# Patient Record
Sex: MALE | Race: WHITE | NOT HISPANIC OR LATINO | Employment: OTHER | URBAN - METROPOLITAN AREA
[De-identification: names, ages, dates, MRNs, and addresses within clinical notes are randomized per-mention and may not be internally consistent; named-entity substitution may affect disease eponyms.]

---

## 2021-03-05 ENCOUNTER — IMMUNIZATION (OUTPATIENT)
Dept: NURSING | Facility: CLINIC | Age: 71
End: 2021-03-05
Payer: COMMERCIAL

## 2021-03-05 PROCEDURE — 91303 PR COVID VAC JANSSEN AD26 0.5ML: CPT

## 2021-03-05 PROCEDURE — 0031A PR COVID VAC JANSSEN AD26 0.5ML: CPT

## 2021-06-15 PROBLEM — Z72.0 TOBACCO USE: Status: ACTIVE | Noted: 2017-05-02

## 2021-06-15 PROBLEM — L03.116 LEFT LEG CELLULITIS: Status: ACTIVE | Noted: 2017-05-02

## 2021-06-15 PROBLEM — N17.9 AKI (ACUTE KIDNEY INJURY) (H): Status: ACTIVE | Noted: 2017-05-02

## 2021-11-24 ENCOUNTER — APPOINTMENT (OUTPATIENT)
Dept: ULTRASOUND IMAGING | Facility: HOSPITAL | Age: 71
End: 2021-11-24
Attending: EMERGENCY MEDICINE
Payer: COMMERCIAL

## 2021-11-24 ENCOUNTER — HOSPITAL ENCOUNTER (EMERGENCY)
Facility: HOSPITAL | Age: 71
Discharge: HOME | End: 2021-11-24
Attending: EMERGENCY MEDICINE | Admitting: EMERGENCY MEDICINE
Payer: COMMERCIAL

## 2021-11-24 VITALS
BODY MASS INDEX: 28.44 KG/M2 | SYSTOLIC BLOOD PRESSURE: 160 MMHG | DIASTOLIC BLOOD PRESSURE: 86 MMHG | TEMPERATURE: 97.4 F | HEART RATE: 74 BPM | OXYGEN SATURATION: 95 % | WEIGHT: 210 LBS | HEIGHT: 72 IN | RESPIRATION RATE: 18 BRPM

## 2021-11-24 DIAGNOSIS — L30.9 ECZEMA, UNSPECIFIED TYPE: ICD-10-CM

## 2021-11-24 DIAGNOSIS — I82.462 ACUTE DEEP VEIN THROMBOSIS (DVT) OF CALF MUSCLE VEIN OF LEFT LOWER EXTREMITY (H): ICD-10-CM

## 2021-11-24 LAB
ANION GAP SERPL CALCULATED.3IONS-SCNC: 11 MMOL/L (ref 5–18)
BUN SERPL-MCNC: 15 MG/DL (ref 8–28)
CALCIUM SERPL-MCNC: 9.5 MG/DL (ref 8.5–10.5)
CHLORIDE BLD-SCNC: 106 MMOL/L (ref 98–107)
CO2 SERPL-SCNC: 23 MMOL/L (ref 22–31)
CREAT SERPL-MCNC: 1.01 MG/DL (ref 0.7–1.3)
D DIMER PPP FEU-MCNC: 2.32 UG/ML FEU (ref 0–0.5)
GFR SERPL CREATININE-BSD FRML MDRD: 73 ML/MIN/1.73M2
GLUCOSE BLD-MCNC: 91 MG/DL (ref 70–125)
INR PPP: 1.01 (ref 0.9–1.15)
POTASSIUM BLD-SCNC: 5 MMOL/L (ref 3.5–5)
SODIUM SERPL-SCNC: 140 MMOL/L (ref 136–145)

## 2021-11-24 PROCEDURE — 93971 EXTREMITY STUDY: CPT | Mod: LT

## 2021-11-24 PROCEDURE — 99284 EMERGENCY DEPT VISIT MOD MDM: CPT | Mod: 25

## 2021-11-24 PROCEDURE — 85027 COMPLETE CBC AUTOMATED: CPT | Performed by: EMERGENCY MEDICINE

## 2021-11-24 PROCEDURE — 80048 BASIC METABOLIC PNL TOTAL CA: CPT | Performed by: EMERGENCY MEDICINE

## 2021-11-24 PROCEDURE — 85610 PROTHROMBIN TIME: CPT | Performed by: EMERGENCY MEDICINE

## 2021-11-24 PROCEDURE — 85379 FIBRIN DEGRADATION QUANT: CPT | Performed by: EMERGENCY MEDICINE

## 2021-11-24 PROCEDURE — 36415 COLL VENOUS BLD VENIPUNCTURE: CPT | Performed by: EMERGENCY MEDICINE

## 2021-11-24 RX ORDER — CEPHALEXIN 500 MG/1
500 CAPSULE ORAL 4 TIMES DAILY
Qty: 28 CAPSULE | Refills: 0 | Status: SHIPPED | OUTPATIENT
Start: 2021-11-24 | End: 2021-11-24

## 2021-11-24 ASSESSMENT — ENCOUNTER SYMPTOMS
CHEST TIGHTNESS: 0
FEVER: 0
COLOR CHANGE: 1
SHORTNESS OF BREATH: 0
COUGH: 0
ABDOMINAL PAIN: 0

## 2021-11-24 ASSESSMENT — MIFFLIN-ST. JEOR: SCORE: 1735.55

## 2021-11-24 NOTE — ED PROVIDER NOTES
Emergency Department Encounter      NAME: Jose Ramirez  AGE: 71 year old male  YOB: 1950  MRN: [id number]  EVALUATION DATE & TIME: 11/24/2021  3:29 PM    PCP: No Ref-Primary, Physician    ED PROVIDER: Agustin Munson M.D.      Chief Complaint   Patient presents with     Edema         FINAL IMPRESSION:  1. Eczema, unspecified type    2. Cellulitis of left lower extremity        MEDICAL DECISION MAKING:    3:40 PM I met with the patient, obtained history, performed an initial exam, and discussed options and plan for diagnostics and treatment here in the ED. PPE: face mask    Pertinent Labs & Imaging studies reviewed. (See chart for details)       This patient is a 71-year-old male with a history of COPD who presents with left leg swelling and pain.  He says over the past week he has noticed some swelling of the left lower leg and recently has become slightly erythematous as well.  He has pain in the medial proximal left calf when he stands and walks.  Says the pain is about a 6 out of 10 with standing but is not present when he is supine.  The patient's history states that he has had pulmonary embolism in the past but he denied ever having a pulmonary embolism or being on blood thinners.  I do note that there is pulmonary emphysema noted on his past medical history.  I am going to send off some lab work and a left lower extremity Doppler ultrasound.  I will likely signed this patient out to the oncoming ER provider to follow-up on these tests.  I suspect that the patient at least has eczema but also possibly cellulitis and/or superficial thrombophlebitis and/or DVT.  I did send a D-dimer with his lab work but this was more to see if he needs a repeat ultrasound next week in case his ultrasound is negative to screen for a occult distal DVT not seen on the ultrasound.      The importance of close follow up was discussed. We reviewed warning signs and symptoms, and I instructed Mr. Ramirez to return to the  emergency department immediately if he develops any new or worsening symptoms. I provided additional verbal discharge instructions. Mr. Ramirez expressed understanding and agreement with this plan of care, his questions were answered, and he was discharged in stable condition.     MEDICATIONS GIVEN IN THE EMERGENCY:  Medications - No data to display    NEW PRESCRIPTIONS STARTED AT TODAY'S ER VISIT:  New Prescriptions    CEPHALEXIN (KEFLEX) 500 MG CAPSULE    Take 1 capsule (500 mg) by mouth 4 times daily for 7 days          =================================================================    HPI    Patient information was obtained from: Patient    Use of : N/A         Jose MATTHEW Ashley is a 71 year old male with a past medical history of COPD, PE, CHER, hyperlipidemia, and tobacco abuse who presents to the ED via walk in for evaluation of lower extremity edema.     Patient reports ongoing left lower leg edema with associated erythema for the past week. He describes the pain as dull and throbbing, rating 6/10 in severity. Pain is provoked with standing and movement and improves with laying down. Patient reports he does limp when he walks due to the pain. He is not on any blood thinners and denies a history of PE. However, per the patient's problem list he does have a history of PE. Patient also reports eczema on his bilateral lower legs. He has had these patches of eczema for the past several years. Patient has seen a dermatologist for this before and has used a topical cream in the past. He is currently not using anything for his eczema and notes he scratches at the patches a lot. Denies fever, cough, shortness of breath, syncope, or any other complaints at this time.     REVIEW OF SYSTEMS   Review of Systems   Constitutional: Negative for fever.   Respiratory: Negative for cough and shortness of breath.    Cardiovascular: Positive for leg swelling (left).   Musculoskeletal: Positive for gait problem.   Skin:  Positive for color change (erythema left leg) and rash (eczema bialteral legs).   Neurological: Negative for syncope.   All other systems reviewed and are negative.       PAST MEDICAL HISTORY:  History reviewed. No pertinent past medical history.    PAST SURGICAL HISTORY:  Past Surgical History:   Procedure Laterality Date     CHOLECYSTECTOMY       LAPAROSCOPIC APPENDECTOMY N/A 3/17/2016    Procedure: APPENDECTOMY LAPAROSCOPIC;  Surgeon: Sebastian Moreno MD;  Location: [facility];  Service:      MOUTH SURGERY         CURRENT MEDICATIONS:    No current facility-administered medications for this encounter.    Current Outpatient Medications:      cephALEXin (KEFLEX) 500 MG capsule, Take 1 capsule (500 mg) by mouth 4 times daily for 7 days, Disp: 28 capsule, Rfl: 0     acetaminophen (TYLENOL) 500 MG tablet, [ACETAMINOPHEN (TYLENOL) 500 MG TABLET] Take 1,000 mg by mouth every 6 (six) hours as needed for pain or fever., Disp: , Rfl:      albuterol (PROVENTIL HFA;VENTOLIN HFA) 90 mcg/actuation inhaler, [ALBUTEROL (PROVENTIL HFA;VENTOLIN HFA) 90 MCG/ACTUATION INHALER] Inhale 2 puffs every 4 (four) hours as needed for wheezing., Disp: , Rfl:      ALPRAZolam (XANAX) 1 MG tablet, [ALPRAZOLAM (XANAX) 1 MG TABLET] Take 1 mg by mouth daily as needed for anxiety., Disp: , Rfl:      cetirizine (ZYRTEC) 10 MG tablet, [CETIRIZINE (ZYRTEC) 10 MG TABLET] Take 10 mg by mouth daily., Disp: , Rfl:      EPINEPHrine (EPIPEN) 0.3 mg/0.3 mL injection, [EPINEPHRINE (EPIPEN) 0.3 MG/0.3 ML INJECTION] Inject 0.3 mL (0.3 mg total) into the shoulder, thigh, or buttocks as needed for anaphylaxis. Inject into thigh., Disp: 1, Rfl: 3     fluocinonide (LIDEX) 0.05 % ointment, [FLUOCINONIDE (LIDEX) 0.05 % OINTMENT] Apply 1 application topically 2 (two) times a day., Disp: , Rfl:      FLUoxetine (PROZAC) 20 MG capsule, [FLUOXETINE (PROZAC) 20 MG CAPSULE] Take 40 mg by mouth daily., Disp: , Rfl:      fluticasone-salmeterol (ADVAIR) 250-50  mcg/dose DISKUS, [FLUTICASONE-SALMETEROL (ADVAIR) 250-50 MCG/DOSE DISKUS] Inhale 1 puff 2 (two) times a day., Disp: , Rfl:      ibuprofen (ADVIL,MOTRIN) 200 MG tablet, [IBUPROFEN (ADVIL,MOTRIN) 200 MG TABLET] Take 400-600 mg by mouth every 8 (eight) hours as needed for pain., Disp: , Rfl:      multivitamin therapeutic (THERAGRAN) tablet, [MULTIVITAMIN THERAPEUTIC (THERAGRAN) TABLET] Take 1 tablet by mouth daily., Disp: , Rfl:      omeprazole (PRILOSEC) 20 MG capsule, [OMEPRAZOLE (PRILOSEC) 20 MG CAPSULE] Take 20 mg by mouth 2 (two) times a day before meals. , Disp: , Rfl:      RANITIDINE HCL (ZANTAC ORAL), [RANITIDINE HCL (ZANTAC ORAL)] Take 2 tablets by mouth 2 (two) times a day as needed., Disp: , Rfl:      simvastatin (ZOCOR) 40 MG tablet, [SIMVASTATIN (ZOCOR) 40 MG TABLET] Take 40 mg by mouth daily., Disp: , Rfl:     ALLERGIES:  Allergies   Allergen Reactions     Polysorbate [Sorbitan]        FAMILY HISTORY:  Family History   Problem Relation Age of Onset     Diabetes Father      Heart Disease Father        SOCIAL HISTORY:   Social History     Socioeconomic History     Marital status:      Spouse name: Not on file     Number of children: Not on file     Years of education: Not on file     Highest education level: Not on file   Occupational History     Not on file   Tobacco Use     Smoking status: Current Every Day Smoker     Packs/day: 0.50     Years: 50.00     Pack years: 25.00     Types: Cigarettes, Cigarettes     Smokeless tobacco: Never Used     Tobacco comment: Stop smoking patient resource.   Substance and Sexual Activity     Alcohol use: Yes     Alcohol/week: 2.0 standard drinks     Comment: Alcoholic Drinks/day: usually 2 drinks/day     Drug use: No     Sexual activity: Not on file   Other Topics Concern     Not on file   Social History Narrative     Not on file     Social Determinants of Health     Financial Resource Strain: Not on file   Food Insecurity: Not on file   Transportation Needs: Not  on file   Physical Activity: Not on file   Stress: Not on file   Social Connections: Not on file   Intimate Partner Violence: Not on file   Housing Stability: Not on file       PHYSICAL EXAM:    Vitals: BP (!) 189/93   Pulse 89   Temp 97.4  F (36.3  C)   Resp 18   Ht 1.829 m (6')   Wt 95.3 kg (210 lb)   SpO2 95%   BMI 28.48 kg/m     Constitutional: Well developed, well nourished. Comfortable appearing.  HENT: Normocephalic, atraumatic, mucous membranes moist, nose normal. Neck- Supple, gross ROM intact.   Eyes: Pupils mid-range, sclera white, no discharge  Respiratory: Clear to auscultation bilaterally, no respiratory distress, no wheezing, speaks full sentences easily.  Cardiovascular: Normal heart rate, regular rhythm, no murmurs. No lower extremity edema, 2+ DP pulses.   GI: Soft, no tenderness to deep palpation in all quadrants, no masses.  Musculoskeletal: Moving all 4 extremities intentionally and without pain. Left leg swollen with slight erythema. Tenderness over medial left calf, thin cord palpated over left proximal shin.   Skin: Warm, dry. Patches of eczema over bilateral shins.   Neurologic: Alert & oriented x 3, speech clear, moving all extremities spontaneously   Psychiatric: Affect normal, cooperative.     LAB:  All pertinent labs reviewed and interpreted.  Labs Ordered and Resulted from Time of ED Arrival to Time of ED Departure - No data to display    RADIOLOGY:  US Lower Extremity Venous Duplex Left    (Results Pending)           IMitzi, am serving as a scribe to document services personally performed by Dr. Agustin Munson based on my observation and the provider's statements to me. IAgustin M.D. attest that Mitzi Maldonado is acting in a scribe capacity, has observed my performance of the services and has documented them in accordance with my direction.      Agustin Munson M.D.  Emergency Medicine  [facility]  EMERGENCY DEPARTMENT  [address] [place] [id number]  [phone]  Dept: [phone]     Agustin Munson MD  11/24/21 3647

## 2021-11-24 NOTE — ED PROVIDER NOTES
ED Provider In Triage Note  [facility]  Encounter Date: Nov 24, 2021    Chief Complaint   Patient presents with     Edema       Brief HPI:   Jose Ramirez is a 71 year old male presenting to the Emergency Department with a chief complaint of bilateral lower extremities that he initially noticed ~24 hours ago. He reports it started with a slight pain in his lower ankle which has migrated up to his calf now. He has a history of chronic obstructive pulmonary disease with chronic shortness of breath; unchanged. No recent travel. No history of DVT. No blood thinner use. No chest pain.    Brief Physical Exam:  BP (!) 189/93   Pulse 89   Temp 97.4  F (36.3  C)   Resp 18   Ht 1.829 m (6')   Wt 95.3 kg (210 lb)   SpO2 95%   BMI 28.48 kg/m    General: Non-toxic appearing  HEENT: Atraumatic  Resp: Diminished breath sounds bilaterally  Abdomen: Non-peritoneal  Skin: scale and excoriation with minimal erythema over lateral left calf. Firm edema proximal left calf, trace edema right calf  Neuro: Alert, oriented, answers questions appropriately  Psych: Behavior appropriate    Plan Initiated in Triage:  No orders of the defined types were placed in this encounter.      PIT Dispo:   Return to lobby while awaiting workup and ED bed availability    Med Berkowitz MD on 11/24/2021 at 3:18 PM    Patient was evaluated by the Physician in Triage due to a limitation of available rooms in the Emergency Department. A plan of care was discussed based on the information obtained on the initial evaluation and patient was consuled to return back to the Emergency Department lobby after this initial evalutaiton until results were obtained or a room became available in the Emergency Department. Patient was counseled not to leave prior to receiving the results of their workup.        Med Berkowitz MD  11/24/21 4359

## 2021-11-24 NOTE — ED PROVIDER NOTES
EMERGENCY DEPARTMENT signout     ED Course & Medical Decision Making         This is a patient that was signed out to me by Dr. Munson.  At the time of signout the DVT ultrasound study was pending.  Plan is to treat for cellulitis if DVT was negative.  DVT scan actually was positive for nonocclusive DVT.    I discussed this with the patient.  I question and plan at length about any PE symptoms.  He is negative for it.  He feels quite well.  He is low risk and he has no contraindications to blood thinning medicine.  I discussed starting blood thinning medicine and he would like to try one of the newer novel agents that does not require monitoring.  I have prescribed a short course of some rivaroxaban to get him through until he can see his primary care provider.    He is given blood thinning medication information, prescription provided, he will follow-up with his regular physician as soon as possible.          Prior to making a final disposition on this patient the results of patient's tests and other diagnostic studies were discussed with the patient. All questions were answered. Patient expressed understanding of the plan and was amenable to it.    Medications - No data to display    Final Impression     1. Eczema, unspecified type    2. Acute deep vein thrombosis (DVT) of calf muscle vein of left lower extremity (H)            Chief Complaint     Chief Complaint   Patient presents with     Edema       Patient ambulatory to triage. Here for swelling and pain at left calf. No trauma. No SOB      HPI     Jose Ramirez is a 71 year old male with a history of hyperlipidemia, PE, COPD, GERD, tobacco abuse, who presents for evaluation of edema.              I, Amanda Morris am serving as a scribe to document services personally performed by Francesco Bazan M.D. based on my observation and the provider's statements to me. I, Francesco Bazan M.D attest that Amanda Morris is acting in a scribe capacity, has observed my  performance of the services and has documented them in accordance with my direction.    Past Medical History     History reviewed. No pertinent past medical history.  Past Surgical History:   Procedure Laterality Date     CHOLECYSTECTOMY       LAPAROSCOPIC APPENDECTOMY N/A 3/17/2016    Procedure: APPENDECTOMY LAPAROSCOPIC;  Surgeon: Sebastian Moreno MD;  Location: [facility];  Service:      MOUTH SURGERY       Family History   Problem Relation Age of Onset     Diabetes Father      Heart Disease Father       Social History     Tobacco Use     Smoking status: Current Every Day Smoker     Packs/day: 0.50     Years: 50.00     Pack years: 25.00     Types: Cigarettes, Cigarettes     Smokeless tobacco: Never Used     Tobacco comment: Stop smoking patient resource.   Substance Use Topics     Alcohol use: Yes     Alcohol/week: 2.0 standard drinks     Comment: Alcoholic Drinks/day: usually 2 drinks/day     Drug use: No       Relevant past medical, surgical, family and social history as documented above, has been reviewed and discussed with patient. No changes or additions, unless otherwise noted in the HPI.    Current Medications     rivaroxaban ANTICOAGULANT (XARELTO) 15 MG TABS tablet  acetaminophen (TYLENOL) 500 MG tablet  albuterol (PROVENTIL HFA;VENTOLIN HFA) 90 mcg/actuation inhaler  ALPRAZolam (XANAX) 1 MG tablet  cetirizine (ZYRTEC) 10 MG tablet  EPINEPHrine (EPIPEN) 0.3 mg/0.3 mL injection  fluocinonide (LIDEX) 0.05 % ointment  FLUoxetine (PROZAC) 20 MG capsule  fluticasone-salmeterol (ADVAIR) 250-50 mcg/dose DISKUS  multivitamin therapeutic (THERAGRAN) tablet  omeprazole (PRILOSEC) 20 MG capsule  RANITIDINE HCL (ZANTAC ORAL)  simvastatin (ZOCOR) 40 MG tablet        Allergies     Allergies   Allergen Reactions     Polysorbate [Sorbitan]        Review of Systems     Review of Systems   Respiratory: Negative for chest tightness and shortness of breath.    Cardiovascular: Positive for leg swelling. Negative for  chest pain.   Gastrointestinal: Negative for abdominal pain.        Remainder of systems reviewed, unless noted in HPI all others negative.    Physical Exam     BP (!) 147/80   Pulse 74   Temp 97.4  F (36.3  C)   Resp 18   Ht 1.829 m (6')   Wt 95.3 kg (210 lb)   SpO2 95%   BMI 28.48 kg/m      Physical Exam  Cardiovascular:      Rate and Rhythm: Normal rate.      Pulses: Normal pulses.   Musculoskeletal:      Comments: Erythema and tenderness to the posterior medial left lower leg   Neurological:      Mental Status: He is alert.             Labs & Imaging         Labs Ordered and Resulted from Time of ED Arrival to Time of ED Departure   D DIMER QUANTITATIVE - Abnormal       Result Value    D-Dimer Quantitative 2.32 (*)    BASIC METABOLIC PANEL - Normal    Sodium 140      Potassium 5.0      Chloride 106      Carbon Dioxide (CO2) 23      Anion Gap 11      Urea Nitrogen 15      Creatinine 1.01      Calcium 9.5      Glucose 91      GFR Estimate 73     INR - Normal    INR 1.01     CBC WITH PLATELETS         Results for orders placed or performed during the hospital encounter of 11/24/21   US Lower Extremity Venous Duplex Left    Impression    IMPRESSION:  1.  Acute DVT within the tibioperoneal trunk, nonocclusive.    2.  Superficial thrombophlebitis within the calf extending from the foot.    Results called to Med Berkowitz at 5:12 PM.   Basic metabolic panel   Result Value Ref Range    Sodium 140 136 - 145 mmol/L    Potassium 5.0 3.5 - 5.0 mmol/L    Chloride 106 98 - 107 mmol/L    Carbon Dioxide (CO2) 23 22 - 31 mmol/L    Anion Gap 11 5 - 18 mmol/L    Urea Nitrogen 15 8 - 28 mg/dL    Creatinine 1.01 0.70 - 1.30 mg/dL    Calcium 9.5 8.5 - 10.5 mg/dL    Glucose 91 70 - 125 mg/dL    GFR Estimate 73 >60 mL/min/1.73m2   Result Value Ref Range    INR 1.01 0.90 - 1.15   D dimer quantitative   Result Value Ref Range    D-Dimer Quantitative 2.32 (H) 0.00 - 0.50 ug/mL CANDI Bazan MD  Emergency Medicine  [facility]  [facility] EMERGENCY DEPARTMENT  [facility] [facility] [id number]  [phone]  11/24/2021       Francesco Bazan MD  11/24/21 8656

## 2021-11-25 LAB
ERYTHROCYTE [DISTWIDTH] IN BLOOD BY AUTOMATED COUNT: 15.2 % (ref 10–15)
HCT VFR BLD AUTO: 44.3 % (ref 40–53)
HGB BLD-MCNC: 15.2 G/DL (ref 13.3–17.7)
MCH RBC QN AUTO: 32.3 PG (ref 26.5–33)
MCHC RBC AUTO-ENTMCNC: 34.3 G/DL (ref 31.5–36.5)
MCV RBC AUTO: 94 FL (ref 78–100)
PLATELET # BLD AUTO: 296 10E3/UL (ref 150–450)
RBC # BLD AUTO: 4.7 10E6/UL (ref 4.4–5.9)
WBC # BLD AUTO: 9 10E3/UL (ref 4–11)

## 2022-01-30 ENCOUNTER — HEALTH MAINTENANCE LETTER (OUTPATIENT)
Age: 72
End: 2022-01-30

## 2022-07-05 ENCOUNTER — APPOINTMENT (OUTPATIENT)
Dept: ULTRASOUND IMAGING | Facility: HOSPITAL | Age: 72
End: 2022-07-05
Attending: STUDENT IN AN ORGANIZED HEALTH CARE EDUCATION/TRAINING PROGRAM
Payer: COMMERCIAL

## 2022-07-05 ENCOUNTER — HOSPITAL ENCOUNTER (EMERGENCY)
Facility: HOSPITAL | Age: 72
Discharge: HOME | End: 2022-07-05
Admitting: PHYSICIAN ASSISTANT
Payer: COMMERCIAL

## 2022-07-05 VITALS
HEIGHT: 72 IN | TEMPERATURE: 98.5 F | DIASTOLIC BLOOD PRESSURE: 74 MMHG | WEIGHT: 220 LBS | OXYGEN SATURATION: 98 % | HEART RATE: 65 BPM | BODY MASS INDEX: 29.8 KG/M2 | RESPIRATION RATE: 18 BRPM | SYSTOLIC BLOOD PRESSURE: 158 MMHG

## 2022-07-05 DIAGNOSIS — M79.661 PAIN OF RIGHT LOWER LEG: ICD-10-CM

## 2022-07-05 PROCEDURE — 93971 EXTREMITY STUDY: CPT | Mod: RT

## 2022-07-05 PROCEDURE — 99284 EMERGENCY DEPT VISIT MOD MDM: CPT | Mod: 25

## 2022-07-05 ASSESSMENT — ENCOUNTER SYMPTOMS
COLOR CHANGE: 0
VOICE CHANGE: 0
MYALGIAS: 1
PALPITATIONS: 0
WHEEZING: 0
SHORTNESS OF BREATH: 0
ABDOMINAL PAIN: 0
HEADACHES: 0
HEMATURIA: 0
ADENOPATHY: 0
WOUND: 0
ARTHRALGIAS: 0
CONFUSION: 0
CHEST TIGHTNESS: 0
AGITATION: 0
DIAPHORESIS: 0
DYSURIA: 0
COUGH: 0
WEAKNESS: 0
FATIGUE: 0

## 2022-07-05 NOTE — ED NOTES
ED Provider In Triage Note  [facility]  Encounter Date: Jul 5, 2022    Chief Complaint   Patient presents with     Leg Pain       Brief HPI:   Jose Ramirez is a 72 year old male presenting to the Emergency Department with a chief complaint of    Hx of Left leg DVT (Nov 2021)  Now it feels simialr in Right leg, started last night  No longer on AC  No new shortness of breath (has COPD, but not different)    Brief Physical Exam:  There were no vitals taken for this visit.  General: Non-toxic appearing  HEENT: Atraumatic  Resp: No respiratory distress  Abdomen: Non-peritoneal  Neuro: Alert, oriented, answers questions appropriately  Psych: Behavior appropriate      Plan Initiated in Triage:  Orders Placed This Encounter     US Lower Extremity Venous Duplex Right       PIT Dispo:   Return to [facility] while awaiting workup and ED bed availability    Amilcar Brasher MD on 7/5/2022 at 3:57 PM    Patient was evaluated by the Physician in Triage due to a limitation of available rooms in the Emergency Department. A plan of care was discussed based on the information obtained on the initial evaluation and patient was consuled to return back to the Emergency Department lob after this initial evalutaiton until results were obtained or a room became available in the Emergency Department. Patient was counseled not to leave prior to receiving the results of their workup.     Amilcar Brasher MD  [facility] EMERGENCY DEPARTMENT  [address] [place] [id number]  [phone]     Amilcar Brasher MD  07/05/22 9213

## 2022-07-05 NOTE — ED TRIAGE NOTES
Pt has hx of blood clots in the L knee (last november). Last night similar pain started in RLL. Pt is no longer on thinners.

## 2022-07-06 NOTE — ED PROVIDER NOTES
EMERGENCY DEPARTMENT ENCOUNTER      NAME: Jose Ramirez  AGE: 72 year old male  YOB: 1950  MRN: [id number]  EVALUATION DATE & TIME: No admission date for patient encounter.    PCP: Blair Clarke    ED PROVIDER: Lucia Owens PA-C      Chief Complaint   Patient presents with     Leg Pain         FINAL IMPRESSION:  1. Pain of right lower leg          MEDICAL DECISION MAKING:    Pertinent Labs & Imaging studies reviewed. (See chart for details)  72 year old male with a h/o PE, COPD, cellulitis, LLE DVT no longer on anticoagulation presents to the Emergency Department for evaluation of right calf pain which began suddenly last night and felt similar to prior presentation of DVTs.  On exam he is alert, nontoxic-appearing and in no acute distress.  Vital signs are WNL.  Calf is supple.  No edema, palpable cord or erythema.    Differential diagnosis includes DVT, superficial thrombophlebitis, Baker's cyst, cellulitis, muscle strain.  Ultrasound of the right lower extremity does not reveal any evidence of DVT, thrombophlebitis or other abnormality.  Pain has improved throughout the day after using ibuprofen.  Favor muscle strain.  Discussed strict return precautions should he develop any increased pain, erythema, numbness or tingling.    There is no evidence of acute or emergent process requiring intervention at this time. Pt is appropriate for outpatient management. Provisional nature of today's diagnosis was discussed and strict return precautions were given. Pt expressed understanding and He was discharged to home in good condition.     CRITICAL CARE: None    ED COURSE  9:20 PM  Met and evaluated patient. Discussed ED plan.   9:25 PM discharged to home in good condition by RN.     MEDICATIONS GIVEN IN THE EMERGENCY:  Medications - No data to display    NEW PRESCRIPTIONS STARTED AT TODAY'S ER VISIT  Discharge Medication List as of 7/5/2022  9:22 PM              =================================================================    HPI    Patient information was obtained from: Patient    Use of Intrepreter: N/A      Jose Ramirez is a 72 year old male who presents for evaluation of right calf pain which began suddenly last night.  He took ibuprofen today and it is improving slightly.  No numbness or tingling of the lower extremity.  He notes that this discomfort felt similar to his previous left lower extremity pain which was found to be from DVTs.  He was placed on a blood thinner which has now been discontinued.  No other symptoms of illness such as chest pain, shortness of breath, fever.  Does not recall any injury to this area.      REVIEW OF SYSTEMS   Review of Systems   Constitutional: Negative for diaphoresis and fatigue.   HENT: Negative for voice change.    Respiratory: Negative for cough, chest tightness, shortness of breath and wheezing.    Cardiovascular: Negative for chest pain and palpitations.   Gastrointestinal: Negative for abdominal pain.   Genitourinary: Negative for dysuria and hematuria.   Musculoskeletal: Positive for myalgias. Negative for arthralgias.   Skin: Negative for color change, pallor, rash and wound.   Neurological: Negative for weakness and headaches.   Hematological: Negative for adenopathy.   Psychiatric/Behavioral: Negative for agitation and confusion.   All other systems reviewed and are negative.        PAST MEDICAL HISTORY:  No past medical history on file.    PAST SURGICAL HISTORY:  Past Surgical History:   Procedure Laterality Date     CHOLECYSTECTOMY       LAPAROSCOPIC APPENDECTOMY N/A 3/17/2016    Procedure: APPENDECTOMY LAPAROSCOPIC;  Surgeon: Sebastian Moreno MD;  Location: [facility] OR;  Service:      MOUTH SURGERY             CURRENT MEDICATIONS:    acetaminophen (TYLENOL) 500 MG tablet  albuterol (PROVENTIL HFA;VENTOLIN HFA) 90 mcg/actuation inhaler  ALPRAZolam (XANAX) 1 MG tablet  cetirizine (ZYRTEC) 10 MG  tablet  EPINEPHrine (EPIPEN) 0.3 mg/0.3 mL injection  fluocinonide (LIDEX) 0.05 % ointment  FLUoxetine (PROZAC) 20 MG capsule  fluticasone-salmeterol (ADVAIR) 250-50 mcg/dose DISKUS  multivitamin therapeutic (THERAGRAN) tablet  omeprazole (PRILOSEC) 20 MG capsule  RANITIDINE HCL (ZANTAC ORAL)  rivaroxaban ANTICOAGULANT (XARELTO) 15 MG TABS tablet  simvastatin (ZOCOR) 40 MG tablet        ALLERGIES:  Allergies   Allergen Reactions     Polysorbate [Sorbitan]        FAMILY HISTORY:  Family History   Problem Relation Age of Onset     Diabetes Father      Heart Disease Father        SOCIAL HISTORY:   Social History     Socioeconomic History     Marital status:      Spouse name: Not on file     Number of children: Not on file     Years of education: Not on file     Highest education level: Not on file   Occupational History     Not on file   Tobacco Use     Smoking status: Current Every Day Smoker     Packs/day: 0.50     Years: 50.00     Pack years: 25.00     Types: Cigarettes, Cigarettes     Smokeless tobacco: Never Used     Tobacco comment: Stop smoking patient resource.   Substance and Sexual Activity     Alcohol use: Yes     Alcohol/week: 2.0 standard drinks     Comment: Alcoholic Drinks/day: usually 2 drinks/day     Drug use: No     Sexual activity: Not on file   Other Topics Concern     Not on file   Social History Narrative     Not on file     Social Determinants of Health     Financial Resource Strain: Not on file   Food Insecurity: Not on file   Transportation Needs: Not on file   Physical Activity: Not on file   Stress: Not on file   Social Connections: Not on file   Intimate Partner Violence: Not on file   Housing Stability: Not on file         VITALS:  Patient Vitals for the past 24 hrs:   BP Temp Temp src Pulse Resp SpO2 Height Weight   07/05/22 2120 (!) 158/74 -- -- 65 18 98 % -- --   07/05/22 1613 132/71 98.5  F (36.9  C) Oral 76 20 95 % -- --   07/05/22 1558 (!) 155/84 98.9  F (37.2  C) Temporal 81  16 96 % -- --   07/05/22 1557 -- -- -- -- -- -- 1.829 m (6') 99.8 kg (220 lb)       PHYSICAL EXAM    Physical Exam  Vitals reviewed.   Constitutional:       General: He is not in acute distress.     Appearance: He is well-developed. He is not ill-appearing, toxic-appearing or diaphoretic.   HENT:      Head: Normocephalic and atraumatic.      Nose: Nose normal.      Mouth/Throat:      Mouth: Mucous membranes are moist.   Cardiovascular:      Rate and Rhythm: Normal rate and regular rhythm.      Pulses: Normal pulses.      Heart sounds: No murmur heard.  Pulmonary:      Effort: Pulmonary effort is normal. No tachypnea or respiratory distress.      Breath sounds: No stridor. No decreased breath sounds, wheezing or rales.   Abdominal:      Palpations: Abdomen is soft.   Musculoskeletal:         General: Tenderness (along right medial calf) present. No swelling. Normal range of motion.      Cervical back: Normal range of motion and neck supple.      Right lower leg: No edema.      Left lower leg: No edema.      Comments: Calf is supple.  No palpable cord.  No overlying erythema or ecchymosis.  Distal CMS intact.   Skin:     General: Skin is warm and dry.      Capillary Refill: Capillary refill takes less than 2 seconds.      Coloration: Skin is not pale.      Findings: No rash.   Neurological:      General: No focal deficit present.      Mental Status: He is alert and oriented to person, place, and time.      Cranial Nerves: No cranial nerve deficit.   Psychiatric:         Mood and Affect: Mood normal.         Behavior: Behavior normal.        LAB:  All pertinent labs reviewed and interpreted.    Labs Ordered and Resulted from Time of ED Arrival to Time of ED Departure - No data to display      RADIOLOGY:  Reviewed all pertinent imaging. Please see official radiology report    US Lower Extremity Venous Duplex Right   Final Result   IMPRESSION:   1.  No deep venous thrombosis in the right lower extremity.          Lucia  BRIANNA Owens  Emergency Medicine  [facility] EMERGENCY DEPARTMENT  [facility] [facility] [id number] [phone]  Dept: [phone]    This note has in part been created with speech recognition technology and may create an occasional, unintended word/grammar substitution. Errors are generally corrected in real time. Please message me via [employer] In Basket if you note any errors requiring clarification.       Lucia Owens PA-C  07/05/22 4743

## 2022-07-06 NOTE — DISCHARGE INSTRUCTIONS
You were seen in the emergency department for concern of right calf pain which felt similar to your previous left calf pain and subsequent DVT.  Thankfully, your ultrasound does not show any evidence of DVT or other blood clot.  It is possible that this is a muscle cramp or strain.  I am glad that it got better after ibuprofen.  He may continue with ibuprofen, Tylenol, massage and elevation.  If your pain gets worse, you develop redness, swelling or numbness and tingling of the foot please return to the emergency department.  Otherwise, please follow-up with your primary care provider.

## 2022-09-25 ENCOUNTER — HEALTH MAINTENANCE LETTER (OUTPATIENT)
Age: 72
End: 2022-09-25

## 2023-05-08 ENCOUNTER — HEALTH MAINTENANCE LETTER (OUTPATIENT)
Age: 73
End: 2023-05-08

## 2024-05-11 ENCOUNTER — HEALTH MAINTENANCE LETTER (OUTPATIENT)
Age: 74
End: 2024-05-11

## 2025-05-17 ENCOUNTER — HEALTH MAINTENANCE LETTER (OUTPATIENT)
Age: 75
End: 2025-05-17

## 2025-05-24 ENCOUNTER — APPOINTMENT (OUTPATIENT)
Dept: ULTRASOUND IMAGING | Facility: CLINIC | Age: 75
DRG: 176 | End: 2025-05-24
Attending: PHYSICIAN ASSISTANT
Payer: COMMERCIAL

## 2025-05-24 ENCOUNTER — HOSPITAL ENCOUNTER (INPATIENT)
Facility: CLINIC | Age: 75
LOS: 1 days | Discharge: HOME | DRG: 176 | End: 2025-05-25
Attending: INTERNAL MEDICINE | Admitting: INTERNAL MEDICINE
Payer: COMMERCIAL

## 2025-05-24 ENCOUNTER — APPOINTMENT (OUTPATIENT)
Dept: CARDIOLOGY | Facility: CLINIC | Age: 75
DRG: 176 | End: 2025-05-24
Attending: PHYSICIAN ASSISTANT
Payer: COMMERCIAL

## 2025-05-24 DIAGNOSIS — R91.8 PULMONARY NODULES: ICD-10-CM

## 2025-05-24 DIAGNOSIS — I26.99 ACUTE PULMONARY EMBOLISM WITHOUT ACUTE COR PULMONALE, UNSPECIFIED PULMONARY EMBOLISM TYPE (H): Primary | ICD-10-CM

## 2025-05-24 LAB
ERYTHROCYTE [DISTWIDTH] IN BLOOD BY AUTOMATED COUNT: NORMAL %
HCT VFR BLD AUTO: NORMAL %
HGB BLD-MCNC: 16.4 G/DL (ref 13.3–17.7)
LACTATE SERPL-SCNC: 1.4 MMOL/L (ref 0.7–2)
LVEF ECHO: NORMAL
MCH RBC QN AUTO: NORMAL PG
MCHC RBC AUTO-ENTMCNC: NORMAL G/DL
MCV RBC AUTO: NORMAL FL
PLAT MORPH BLD: ABNORMAL
PLATELET # BLD AUTO: 296 10E3/UL (ref 150–450)
RBC # BLD AUTO: NORMAL 10*6/UL
RBC AGGLUT BLD QL: PRESENT
RBC MORPH BLD: ABNORMAL
UFH PPP CHRO-ACNC: 0.64 IU/ML (ref ?–1.1)
UFH PPP CHRO-ACNC: 0.74 IU/ML (ref ?–1.1)
WBC # BLD AUTO: 6.1 10E3/UL (ref 4–11)

## 2025-05-24 PROCEDURE — 93970 EXTREMITY STUDY: CPT

## 2025-05-24 PROCEDURE — 93306 TTE W/DOPPLER COMPLETE: CPT

## 2025-05-24 PROCEDURE — 83605 ASSAY OF LACTIC ACID: CPT | Performed by: PHYSICIAN ASSISTANT

## 2025-05-24 PROCEDURE — 120N000013 HC R&B IMCU

## 2025-05-24 PROCEDURE — 99223 1ST HOSP IP/OBS HIGH 75: CPT | Performed by: PHYSICIAN ASSISTANT

## 2025-05-24 PROCEDURE — 250N000011 HC RX IP 250 OP 636: Performed by: PHYSICIAN ASSISTANT

## 2025-05-24 PROCEDURE — 85018 HEMOGLOBIN: CPT | Performed by: PHYSICIAN ASSISTANT

## 2025-05-24 PROCEDURE — 36415 COLL VENOUS BLD VENIPUNCTURE: CPT | Performed by: PHYSICIAN ASSISTANT

## 2025-05-24 PROCEDURE — 85520 HEPARIN ASSAY: CPT | Performed by: PHYSICIAN ASSISTANT

## 2025-05-24 PROCEDURE — 250N000013 HC RX MED GY IP 250 OP 250 PS 637: Performed by: PHYSICIAN ASSISTANT

## 2025-05-24 PROCEDURE — 85520 HEPARIN ASSAY: CPT | Performed by: INTERNAL MEDICINE

## 2025-05-24 PROCEDURE — 36415 COLL VENOUS BLD VENIPUNCTURE: CPT | Performed by: INTERNAL MEDICINE

## 2025-05-24 RX ORDER — HYDROMORPHONE HCL IN WATER/PF 6 MG/30 ML
0.4 PATIENT CONTROLLED ANALGESIA SYRINGE INTRAVENOUS
Refills: 0 | Status: DISCONTINUED | OUTPATIENT
Start: 2025-05-24 | End: 2025-05-25

## 2025-05-24 RX ORDER — ONDANSETRON 2 MG/ML
4 INJECTION INTRAMUSCULAR; INTRAVENOUS EVERY 6 HOURS PRN
Status: DISCONTINUED | OUTPATIENT
Start: 2025-05-24 | End: 2025-05-25 | Stop reason: HOSPADM

## 2025-05-24 RX ORDER — AMOXICILLIN 250 MG
1 CAPSULE ORAL 2 TIMES DAILY PRN
Status: DISCONTINUED | OUTPATIENT
Start: 2025-05-24 | End: 2025-05-25 | Stop reason: HOSPADM

## 2025-05-24 RX ORDER — NALOXONE HYDROCHLORIDE 0.4 MG/ML
0.4 INJECTION, SOLUTION INTRAMUSCULAR; INTRAVENOUS; SUBCUTANEOUS
Status: DISCONTINUED | OUTPATIENT
Start: 2025-05-24 | End: 2025-05-25 | Stop reason: HOSPADM

## 2025-05-24 RX ORDER — TAMSULOSIN HYDROCHLORIDE 0.4 MG/1
0.4 CAPSULE ORAL DAILY
COMMUNITY

## 2025-05-24 RX ORDER — ACETAMINOPHEN 650 MG/1
650 SUPPOSITORY RECTAL EVERY 4 HOURS PRN
Status: DISCONTINUED | OUTPATIENT
Start: 2025-05-24 | End: 2025-05-25 | Stop reason: HOSPADM

## 2025-05-24 RX ORDER — OXYCODONE HYDROCHLORIDE 5 MG/1
5 TABLET ORAL EVERY 4 HOURS PRN
Refills: 0 | Status: DISCONTINUED | OUTPATIENT
Start: 2025-05-24 | End: 2025-05-25 | Stop reason: HOSPADM

## 2025-05-24 RX ORDER — NALOXONE HYDROCHLORIDE 0.4 MG/ML
0.2 INJECTION, SOLUTION INTRAMUSCULAR; INTRAVENOUS; SUBCUTANEOUS
Status: DISCONTINUED | OUTPATIENT
Start: 2025-05-24 | End: 2025-05-25 | Stop reason: HOSPADM

## 2025-05-24 RX ORDER — HEPARIN SODIUM 10000 [USP'U]/100ML
0-5000 INJECTION, SOLUTION INTRAVENOUS CONTINUOUS
Status: DISPENSED | OUTPATIENT
Start: 2025-05-24 | End: 2025-05-25

## 2025-05-24 RX ORDER — NICOTINE 21 MG/24HR
1 PATCH, TRANSDERMAL 24 HOURS TRANSDERMAL DAILY
Status: DISCONTINUED | OUTPATIENT
Start: 2025-05-24 | End: 2025-05-25 | Stop reason: HOSPADM

## 2025-05-24 RX ORDER — IPRATROPIUM BROMIDE AND ALBUTEROL SULFATE 2.5; .5 MG/3ML; MG/3ML
3 SOLUTION RESPIRATORY (INHALATION) EVERY 4 HOURS PRN
Status: DISCONTINUED | OUTPATIENT
Start: 2025-05-24 | End: 2025-05-25 | Stop reason: HOSPADM

## 2025-05-24 RX ORDER — ATORVASTATIN CALCIUM 40 MG/1
40 TABLET, FILM COATED ORAL DAILY
Status: DISCONTINUED | OUTPATIENT
Start: 2025-05-24 | End: 2025-05-25 | Stop reason: HOSPADM

## 2025-05-24 RX ORDER — TAMSULOSIN HYDROCHLORIDE 0.4 MG/1
0.4 CAPSULE ORAL DAILY
Status: DISCONTINUED | OUTPATIENT
Start: 2025-05-24 | End: 2025-05-25 | Stop reason: HOSPADM

## 2025-05-24 RX ORDER — ONDANSETRON 4 MG/1
4 TABLET, ORALLY DISINTEGRATING ORAL EVERY 6 HOURS PRN
Status: DISCONTINUED | OUTPATIENT
Start: 2025-05-24 | End: 2025-05-25 | Stop reason: HOSPADM

## 2025-05-24 RX ORDER — AMOXICILLIN 250 MG
2 CAPSULE ORAL 2 TIMES DAILY PRN
Status: DISCONTINUED | OUTPATIENT
Start: 2025-05-24 | End: 2025-05-25 | Stop reason: HOSPADM

## 2025-05-24 RX ORDER — ALBUTEROL SULFATE 90 UG/1
2 INHALANT RESPIRATORY (INHALATION) EVERY 4 HOURS PRN
Status: DISCONTINUED | OUTPATIENT
Start: 2025-05-24 | End: 2025-05-25 | Stop reason: HOSPADM

## 2025-05-24 RX ORDER — ATORVASTATIN CALCIUM 40 MG/1
40 TABLET, FILM COATED ORAL DAILY
COMMUNITY

## 2025-05-24 RX ORDER — HYDROMORPHONE HCL IN WATER/PF 6 MG/30 ML
0.2 PATIENT CONTROLLED ANALGESIA SYRINGE INTRAVENOUS
Refills: 0 | Status: DISCONTINUED | OUTPATIENT
Start: 2025-05-24 | End: 2025-05-25

## 2025-05-24 RX ORDER — CALCIUM CARBONATE 500 MG/1
1000 TABLET, CHEWABLE ORAL 4 TIMES DAILY PRN
Status: DISCONTINUED | OUTPATIENT
Start: 2025-05-24 | End: 2025-05-25 | Stop reason: HOSPADM

## 2025-05-24 RX ORDER — FLUTICASONE FUROATE AND VILANTEROL 100; 25 UG/1; UG/1
1 POWDER RESPIRATORY (INHALATION) DAILY
Status: DISCONTINUED | OUTPATIENT
Start: 2025-05-24 | End: 2025-05-25 | Stop reason: HOSPADM

## 2025-05-24 RX ORDER — ALPRAZOLAM 1 MG/1
1 TABLET ORAL DAILY PRN
Status: DISCONTINUED | OUTPATIENT
Start: 2025-05-24 | End: 2025-05-25 | Stop reason: HOSPADM

## 2025-05-24 RX ORDER — PANTOPRAZOLE SODIUM 40 MG/1
40 TABLET, DELAYED RELEASE ORAL 2 TIMES DAILY
Status: DISCONTINUED | OUTPATIENT
Start: 2025-05-24 | End: 2025-05-25 | Stop reason: HOSPADM

## 2025-05-24 RX ORDER — ACETAMINOPHEN 325 MG/1
650 TABLET ORAL EVERY 4 HOURS PRN
Status: DISCONTINUED | OUTPATIENT
Start: 2025-05-24 | End: 2025-05-25 | Stop reason: HOSPADM

## 2025-05-24 RX ADMIN — HEPARIN SODIUM 1650 UNITS/HR: 10000 INJECTION, SOLUTION INTRAVENOUS at 12:23

## 2025-05-24 RX ADMIN — FLUOXETINE HYDROCHLORIDE 40 MG: 20 CAPSULE ORAL at 12:32

## 2025-05-24 RX ADMIN — TAMSULOSIN HYDROCHLORIDE 0.4 MG: 0.4 CAPSULE ORAL at 12:32

## 2025-05-24 RX ADMIN — PANTOPRAZOLE SODIUM 40 MG: 40 TABLET, DELAYED RELEASE ORAL at 12:35

## 2025-05-24 RX ADMIN — FLUTICASONE FUROATE AND VILANTEROL TRIFENATATE 1 PUFF: 100; 25 POWDER RESPIRATORY (INHALATION) at 14:28

## 2025-05-24 RX ADMIN — NICOTINE 1 PATCH: 21 PATCH, EXTENDED RELEASE TRANSDERMAL at 12:31

## 2025-05-24 RX ADMIN — PANTOPRAZOLE SODIUM 40 MG: 40 TABLET, DELAYED RELEASE ORAL at 21:17

## 2025-05-24 RX ADMIN — ALPRAZOLAM 1 MG: 1 TABLET ORAL at 18:58

## 2025-05-24 RX ADMIN — ATORVASTATIN CALCIUM 40 MG: 40 TABLET, FILM COATED ORAL at 12:32

## 2025-05-24 ASSESSMENT — ACTIVITIES OF DAILY LIVING (ADL)
ADLS_ACUITY_SCORE: 16
ADLS_ACUITY_SCORE: 41
ADLS_ACUITY_SCORE: 16
ADLS_ACUITY_SCORE: 42
ADLS_ACUITY_SCORE: 16

## 2025-05-24 NOTE — PHARMACY-ADMISSION MEDICATION HISTORY
Pharmacist Admission Medication History    Admission medication history is complete. The information provided in this note is only as accurate as the sources available at the time of the update.    Information Source(s): Patient via in-person    Pertinent Information: Pt was prescribed Xarelto but hasn't taken over 6 months    Changes made to PTA medication list:  Added: Lipitor, Flomax  Deleted: Xarelto, zocor, zantac, lidex ointment  Changed: Omeprazole dose    Allergies reviewed with patient and updates made in EHR: no    Medication History Completed By: Martha Alexander [facility] 5/24/2025 10:32 AM    PTA Med List   Medication Sig Last Dose/Taking    acetaminophen (TYLENOL) 500 MG tablet [ACETAMINOPHEN (TYLENOL) 500 MG TABLET] Take 1,000 mg by mouth every 6 (six) hours as needed for pain or fever. Past Week    albuterol (PROVENTIL HFA;VENTOLIN HFA) 90 mcg/actuation inhaler [ALBUTEROL (PROVENTIL HFA;VENTOLIN HFA) 90 MCG/ACTUATION INHALER] Inhale 2 puffs every 4 (four) hours as needed for wheezing. Past Week    ALPRAZolam (XANAX) 1 MG tablet [ALPRAZOLAM (XANAX) 1 MG TABLET] Take 1 mg by mouth daily as needed for anxiety. Past Week    atorvastatin (LIPITOR) 40 MG tablet Take 40 mg by mouth daily. Past Week    cetirizine (ZYRTEC) 10 MG tablet [CETIRIZINE (ZYRTEC) 10 MG TABLET] Take 10 mg by mouth daily. Past Week    EPINEPHrine (EPIPEN) 0.3 mg/0.3 mL injection [EPINEPHRINE (EPIPEN) 0.3 MG/0.3 ML INJECTION] Inject 0.3 mL (0.3 mg total) into the shoulder, thigh, or buttocks as needed for anaphylaxis. Inject into thigh. Taking As Needed    FLUoxetine (PROZAC) 20 MG capsule [FLUOXETINE (PROZAC) 20 MG CAPSULE] Take 40 mg by mouth daily. Past Week    fluticasone-salmeterol (ADVAIR) 250-50 mcg/dose DISKUS [FLUTICASONE-SALMETEROL (ADVAIR) 250-50 MCG/DOSE DISKUS] Inhale 1 puff 2 (two) times a day. Past Week    multivitamin therapeutic (THERAGRAN) tablet [MULTIVITAMIN THERAPEUTIC (THERAGRAN) TABLET] Take 1 tablet by mouth daily. Past  Week    omeprazole (PRILOSEC) 20 MG capsule Take 40 mg by mouth daily. Past Week    tamsulosin (FLOMAX) 0.4 MG capsule Take 0.4 mg by mouth daily. Past Week

## 2025-05-24 NOTE — H&P
"[facility]    History and Physical - Hospitalist Service       Date of Admission:  5/24/2025    Assessment & Plan      Jose TIFF Ramirez, \"Billy, \" is a 75 year old male with a past medical history significant for COPD, HLD, pre-DM, GERD, anxiety, coronary calcification per CT who is a direct admission to [facility] from [facility] on 5/24/2025 for further management of pulmonary emboli.     Acute bilateral pulmonary emboli with possible right heart strain   Patient presented to OSH with acute onset shortness of breath the day of admission.   *d dimer 1.28  *CT chest shows acute segmental and subsegmental PEs of bilateral upper lobes, right middle lobe, right lower lobe with associated moderate right heart strain, among other findings below   *troponin at OSH within normal range x2  He has previous history of PE 2016 though to be provoked after surgery for appendicitis and a DVT in 2022 though to be provoked by immobility due to sciatica and chronic venous insufficiency. He saw Hematology though [facility] at that time.    In the ED RA oxygen saturations 88-92%, on admission low 90s on RA with otherwise stable VS. SOB improved   --admit to [facility]  --continue heparin drip  --ECHO pending   --spoke with IR , no role for intervention at this time given PE burden.  --pharmacy liaison consult for DOAC coverage; given this is his third event he will likely need lifelong anticoagulation   --US lower extremities   --recommend follow up Hematology on discharge    New and enlarging pulmonary nodules with mild right hilar lymphadenopathy  CT chest in ED shows new 6mm nodule RUL and enlarging pulmonary nodule LLL  measuring 7 mm as well as mild right hilar adenopathy. Prior CTs have shows sub 6mm nodules which are unchanged.   He is a current smoker. Need for close follow up/surveillance was discussed on admission.   --will need close follow up with established Heme/Onc given recurrent clot and enlarging " nodules for surveillance plan. Will ask CC to assist with this     COPD  CT shows mild to moderate emphysema. He reports chronic productive cough unchanged currently.   In the ED he did receive 60mg oral prednisone and duoneb  PTA hs is on Advair   Low suspicion for acute exacerbation   --continue PTA Advair  --duonebs prn  --no indication to continue steroids at this time     AAA 4.0cm  Seen on CT imaging  --follow up PCP for surveillance     Thyroid nodules  Seen incidentally on CT.   --Outpatient thyroid US     Other chronic stable medical conditions:  HLD- Continue statin  GERD- Continue PPI   ARACELY- Continue PTA fluoxetine   BPH-  continue flomax        Diet:  NPO   DVT Prophylaxis: heparin   Wheat Catheter: Not present  Lines: None     Cardiac Monitoring: None  Code Status:  FULL CODE     Clinically Significant Risk Factors Present on Admission                # Drug Induced Coagulation Defect: home medication list includes an anticoagulant medication                         Disposition Plan     Medically Ready for Discharge: Anticipated in 2-4 Days         The patient's care was discussed with Dr. Oliveira who agrees with the above plan     Kyara Godoy PA-C  Hospitalist Service  [facility]  Securely message with [employer] (more info)  Text page via [employer] Paging/Directory     ______________________________________________________________________    Chief Complaint   Shortness of breath, PE     History is obtained from the patient    History of Present Illness   Jose Ramirez is a 75 year old male with a past medical history significant for COPD, HLD, pre-DM, GERD, anxiety, coronary calcification per CT who is a direct admission to [facility] from [facility] on 5/24/2025 for further management of pulmonary emboli.   Patient reports that for the past few weeks he has been experiencing mild increase in his baseline shortness of breath.  He has not had any chest pain, palpitations, lower  extremity edema.  He has a chronic congested cough which is unchanged.  He has had no fevers or chills.  He does report intermittent night sweats for some time.  When he got up overnight to urinate he felt significantly short of breath.  He thought he may be having a panic attack and did not have a benzodiazepine with him.  When his symptoms did not improve he presented to a local emergency room in [facility].  He was found to have an elevated D-dimer and CT was obtained showing bilateral pulmonary emboli.  He was started on heparin drip.  While in the ED he also received oral prednisone and a DuoNeb given his history of COPD.  He was hemodynamically stable with oxygen saturations in the low 90s on room air though chart review indicates he did desat to the high 80s when sleeping.  Transfer to [facility] was requested for further management of PEs.  He is presently evaluated in his room on [facility].  He reports he is feeling well at this time.  Denies shortness of breath, chest pain, palpitations.  He does report he had some transient mild left leg pain in the last couple of weeks but he also has a bad knee on that side which he attributed the pain to.  Denies any calf pain or swelling presently.    Past Medical History    COPD  HLD  Pre DM  GERD  Anxiety  Coronary calcifications  Hx DVT, PE,  detailed above     Past Surgical History   Past Surgical History:   Procedure Laterality Date    CHOLECYSTECTOMY      LAPAROSCOPIC APPENDECTOMY N/A 3/17/2016    Procedure: APPENDECTOMY LAPAROSCOPIC;  Surgeon: Sebastian Moreno MD;  Location: [facility];  Service:     MOUTH SURGERY         Prior to Admission Medications   Prior to Admission Medications   Prescriptions Last Dose Informant Patient Reported? Taking?   ALPRAZolam (XANAX) 1 MG tablet   Yes No   Sig: [ALPRAZOLAM (XANAX) 1 MG TABLET] Take 1 mg by mouth daily as needed for anxiety.   EPINEPHrine (EPIPEN) 0.3 mg/0.3 mL injection   No No   Sig:  [EPINEPHRINE (EPIPEN) 0.3 MG/0.3 ML INJECTION] Inject 0.3 mL (0.3 mg total) into the shoulder, thigh, or buttocks as needed for anaphylaxis. Inject into thigh.   FLUoxetine (PROZAC) 20 MG capsule   Yes No   Sig: [FLUOXETINE (PROZAC) 20 MG CAPSULE] Take 40 mg by mouth daily.   RANITIDINE HCL (ZANTAC ORAL)   Yes No   Sig: [RANITIDINE HCL (ZANTAC ORAL)] Take 2 tablets by mouth 2 (two) times a day as needed.   acetaminophen (TYLENOL) 500 MG tablet   Yes No   Sig: [ACETAMINOPHEN (TYLENOL) 500 MG TABLET] Take 1,000 mg by mouth every 6 (six) hours as needed for pain or fever.   albuterol (PROVENTIL HFA;VENTOLIN HFA) 90 mcg/actuation inhaler   Yes No   Sig: [ALBUTEROL (PROVENTIL HFA;VENTOLIN HFA) 90 MCG/ACTUATION INHALER] Inhale 2 puffs every 4 (four) hours as needed for wheezing.   cetirizine (ZYRTEC) 10 MG tablet   Yes No   Sig: [CETIRIZINE (ZYRTEC) 10 MG TABLET] Take 10 mg by mouth daily.   fluocinonide (LIDEX) 0.05 % ointment   Yes No   Sig: [FLUOCINONIDE (LIDEX) 0.05 % OINTMENT] Apply 1 application topically 2 (two) times a day.   fluticasone-salmeterol (ADVAIR) 250-50 mcg/dose DISKUS   Yes No   Sig: [FLUTICASONE-SALMETEROL (ADVAIR) 250-50 MCG/DOSE DISKUS] Inhale 1 puff 2 (two) times a day.   multivitamin therapeutic (THERAGRAN) tablet   Yes No   Sig: [MULTIVITAMIN THERAPEUTIC (THERAGRAN) TABLET] Take 1 tablet by mouth daily.   omeprazole (PRILOSEC) 20 MG capsule   Yes No   Sig: [OMEPRAZOLE (PRILOSEC) 20 MG CAPSULE] Take 20 mg by mouth 2 (two) times a day before meals.    rivaroxaban ANTICOAGULANT (XARELTO) 15 MG TABS tablet   No No   Sig: Take 1 tablet (15 mg) by mouth 2 times daily (with meals)   simvastatin (ZOCOR) 40 MG tablet   Yes No   Sig: [SIMVASTATIN (ZOCOR) 40 MG TABLET] Take 40 mg by mouth daily.      Facility-Administered Medications: None        Social History   Current smoker, smokes ~1/2 ppd    Physical Exam   Temp: 98.7  F (37.1  C) Temp src: Oral BP: (!) 144/81 Pulse: 93   Resp: 18 SpO2: 93 % O2  Device: None (Room air)    There were no vitals filed for this visit.  Vital Signs with Ranges  Temp:  [98.7  F (37.1  C)] 98.7  F (37.1  C)  Pulse:  [93] 93  Resp:  [18] 18  BP: (144)/(81) 144/81  SpO2:  [93 %] 93 %  No intake/output data recorded.    Constitutional: Alert and oriented, sitting up in bed. Appears comfortable and is appropriately conversant   ENT: moist mucous membranes  Respiratory: Lungs clear to auscultation bilaterally, no increased work of breathing or wheezing  Cardiovascular: Regular rate and rhythm, no significant LE edema   GI:  active bowel sounds, abdomen soft, non-tender  Skin/Integumen: erythematous,scaling rash bilateral LEs (previously diagnosed with eczema)  MSK: calves are soft, non tender. Moves all four extremities   Neuro: speech is clear. Face symmetric. Follows commands         Medical Decision Making       70 MINUTES SPENT BY ME on the date of service doing chart review, history, exam, documentation & further activities per the note.      Data         Imaging results reviewed over the past 24 hrs:   Recent Results (from the past 24 hours)   XR Chest 2 Views    Narrative    EXAM: XR CHEST 2 VIEWS  LOCATION: [facility]  DATE: 5/24/2025    INDICATION: sob  COMPARISON: 1/16/2018.    IMPRESSION: Mild opacity within the right lower lung medially may represent pneumonia, please correlate clinically for infection. Follow-up radiograph is recommended. Left lung clear. No pleural effusion or pneumothorax. Mild pulmonary hyperinflation compatible with emphysema seen on chest CT 1/16/2018.    Electronically signed by: Moy Pinto MD 5/24/2025 1:12 AM CDT   CTA Chest with Contrast    Narrative    EXAM: CT ANGIO CHEST PE  LOCATION: [facility]  DATE: 5/24/2025    INDICATION: Elevated d-dimer.  COMPARISON: 1/16/2018.  TECHNIQUE: CT chest pulmonary angiogram during arterial phase injection of IV contrast. Multiplanar reformats and MIP reconstructions were performed. Dose reduction  techniques were used.   CONTRAST: None.    FINDINGS:    ANGIOGRAM CHEST: There are segmental and subsegmental acute pulmonary emboli of the bilateral upper, right middle, and right lower lobe pulmonary arterial systems.    Moderate right heart strain (RV: LV ratio measures 1.2:1), with associated flattening of the interventricular septum.    Ascending thoracic aortic aneurysm, measuring 4.0 cm. Mild atherosclerosis.    LUNGS AND PLEURA: Mild tracheobronchial debris. Mild diffuse bronchial wall thickening. Mild-to-moderate centrilobular and paraseptal emphysema. No acute airspace consolidation. Mild dependent atelectatic change.    New 6 mm nodule of the right upper lobe, series 5 image 66. Enlarging 7 mm nodule of the left lower lobe, series 5 image 91, previously 4 mm. Other sub-6 mm pulmonary nodules are unchanged.    No pneumothorax.     No pleural effusion.    MEDIASTINUM/AXILLAE: Low-attenuation thyroid nodules, largest measuring up to 19 mm in the right, series 4 image 17. Nonemergent thyroid sonography follow-up is recommended. Mild right hilar lymphadenopathy. For reference, an enlarged right hilar lymph node measures 12 mm in short axis, series 4 image 75.     Thoracic esophagus is unremarkable.      No axillary lymphadenopathy.    Mild bilateral gynecomastia.    Heart is normal in size. Trace pericardial fluid.    CORONARY ARTERY CALCIFICATION: Severe.    UPPER ABDOMEN: Cholecystectomy. Pancreatic body and tail calcifications, consistent with chronic pancreatitis. Associated mild dilatation and irregularity of the main pancreatic duct.    MUSCULOSKELETAL: No suspicious abnormality.    OTHER: No additionally suspicious abnormality.    IMPRESSION:  1.  Acute segmental and subsegmental pulmonary emboli, with associated moderate right heart strain.  2.  New and enlarging pulmonary nodules, largest measuring 7 mm in the left lower lobe. A follow-up chest CT is recommended in 6 months.  3.  Mild right hilar  lymphadenopathy. Attention on follow-up to CT.  4.  Ascending thoracic aortic aneurysm, measuring 4.0 cm.  5.  Mild-to-moderate pulmonary emphysema.  6.  Chronic pancreatitis.  7.  Low-attenuation thyroid nodules, largest measuring up to 19 mm in the right. Nonemergent thyroid sonography follow-up recommended.    Critical Result: VTE (PE/DVT)    Finding was identified on 5/24/2025 2:34 AM CDT.    Dr. Bland was contacted by me on 5/24/2025 2:36 AM CDT and verbalized understanding of the critical result.       ATTENTION: ABNORMAL REPORT    THE DICTATION ABOVE DESCRIBES AN ABNORMALITY FOR WHICH FOLLOWUP IS NEEDED.    Electronically signed by: Francesco Flores MD 5/24/2025 2:37 AM CDT

## 2025-05-24 NOTE — PLAN OF CARE
Patient arrived on unit at 0830. On arrival, heparin infusing @ 1500 units/hr. Rate changed to 1650 units/hr by pharmacist, increased by writer at 1016. Per pharmacist, first Xa timed for six hours after rate increase as we didn't know exactly when it was started at previous facility. Xa 0.74; heparin paused per protocol and restarted at 1550 units/hr. Next Xa timed at 2300.     Patient A/Ox4. Tele NSR. Denies pain. VSS, RA. Needed 1-2L when sleeping at previous facility. PAULA. LS diminished. Chronic congested cough.     Scattered scabs/flaky/cracked skin which patient reports is eczema; most prominent on shins.     Patient SBA, able to be independent if disconnected from monitor.

## 2025-05-25 VITALS
SYSTOLIC BLOOD PRESSURE: 137 MMHG | TEMPERATURE: 98 F | RESPIRATION RATE: 15 BRPM | DIASTOLIC BLOOD PRESSURE: 91 MMHG | OXYGEN SATURATION: 93 % | BODY MASS INDEX: 27.43 KG/M2 | HEIGHT: 72 IN | WEIGHT: 202.5 LBS | HEART RATE: 62 BPM

## 2025-05-25 LAB
ANION GAP SERPL CALCULATED.3IONS-SCNC: 9 MMOL/L (ref 7–15)
BUN SERPL-MCNC: 22.6 MG/DL (ref 8–23)
CALCIUM SERPL-MCNC: 8.8 MG/DL (ref 8.8–10.4)
CHLORIDE SERPL-SCNC: 107 MMOL/L (ref 98–107)
CREAT SERPL-MCNC: 1.06 MG/DL (ref 0.67–1.17)
EGFRCR SERPLBLD CKD-EPI 2021: 72 ML/MIN/1.73M2
ERYTHROCYTE [DISTWIDTH] IN BLOOD BY AUTOMATED COUNT: NORMAL %
GLUCOSE SERPL-MCNC: 119 MG/DL (ref 70–99)
HCO3 SERPL-SCNC: 24 MMOL/L (ref 22–29)
HCT VFR BLD AUTO: NORMAL %
HGB BLD-MCNC: 14 G/DL (ref 13.3–17.7)
MCH RBC QN AUTO: NORMAL PG
MCHC RBC AUTO-ENTMCNC: NORMAL G/DL
MCV RBC AUTO: NORMAL FL
PLAT MORPH BLD: ABNORMAL
PLATELET # BLD AUTO: 277 10E3/UL (ref 150–450)
POTASSIUM SERPL-SCNC: 4 MMOL/L (ref 3.4–5.3)
RBC # BLD AUTO: NORMAL 10*6/UL
RBC AGGLUT BLD QL: PRESENT
RBC MORPH BLD: ABNORMAL
SODIUM SERPL-SCNC: 140 MMOL/L (ref 135–145)
UFH PPP CHRO-ACNC: 0.95 IU/ML (ref ?–1.1)
VARIANT LYMPHS BLD QL SMEAR: PRESENT
WBC # BLD AUTO: 8 10E3/UL (ref 4–11)

## 2025-05-25 PROCEDURE — 250N000011 HC RX IP 250 OP 636: Performed by: PHYSICIAN ASSISTANT

## 2025-05-25 PROCEDURE — 250N000013 HC RX MED GY IP 250 OP 250 PS 637: Performed by: HOSPITALIST

## 2025-05-25 PROCEDURE — 85520 HEPARIN ASSAY: CPT | Performed by: INTERNAL MEDICINE

## 2025-05-25 PROCEDURE — 82435 ASSAY OF BLOOD CHLORIDE: CPT | Performed by: PHYSICIAN ASSISTANT

## 2025-05-25 PROCEDURE — 99239 HOSP IP/OBS DSCHRG MGMT >30: CPT | Performed by: HOSPITALIST

## 2025-05-25 PROCEDURE — 250N000013 HC RX MED GY IP 250 OP 250 PS 637: Performed by: PHYSICIAN ASSISTANT

## 2025-05-25 PROCEDURE — 36415 COLL VENOUS BLD VENIPUNCTURE: CPT | Performed by: PHYSICIAN ASSISTANT

## 2025-05-25 PROCEDURE — 85018 HEMOGLOBIN: CPT | Performed by: PHYSICIAN ASSISTANT

## 2025-05-25 RX ADMIN — APIXABAN 10 MG: 5 TABLET, FILM COATED ORAL at 09:05

## 2025-05-25 RX ADMIN — HEPARIN SODIUM 1550 UNITS/HR: 10000 INJECTION, SOLUTION INTRAVENOUS at 05:33

## 2025-05-25 RX ADMIN — FLUTICASONE FUROATE AND VILANTEROL TRIFENATATE 1 PUFF: 100; 25 POWDER RESPIRATORY (INHALATION) at 09:07

## 2025-05-25 RX ADMIN — PANTOPRAZOLE SODIUM 40 MG: 40 TABLET, DELAYED RELEASE ORAL at 09:05

## 2025-05-25 RX ADMIN — ATORVASTATIN CALCIUM 40 MG: 40 TABLET, FILM COATED ORAL at 09:05

## 2025-05-25 RX ADMIN — FLUOXETINE HYDROCHLORIDE 40 MG: 20 CAPSULE ORAL at 09:05

## 2025-05-25 RX ADMIN — TAMSULOSIN HYDROCHLORIDE 0.4 MG: 0.4 CAPSULE ORAL at 09:05

## 2025-05-25 ASSESSMENT — ACTIVITIES OF DAILY LIVING (ADL)
ADLS_ACUITY_SCORE: 16
DEPENDENT_IADLS:: INDEPENDENT
ADLS_ACUITY_SCORE: 16
ADLS_ACUITY_SCORE: 15

## 2025-05-25 NOTE — CONSULTS
Care Management Initial Consult    General Information  Assessment completed with: Patient,    Type of CM/SW Visit: Initial Assessment    Primary Care Provider verified and updated as needed: Yes (Gerardo Clarke)   Readmission within the last 30 days: no previous admission in last 30 days      Reason for Consult: other (see comments) (assist in scheduling hem/onc appt)  Advance Care Planning: Advance Care Planning Reviewed: no concerns identified          Communication Assessment  Patient's communication style: spoken language (English or Bilingual)    Hearing Difficulty or Deaf: no   Wear Glasses or Blind: no    Cognitive  Cognitive/Neuro/Behavioral: WDL  Level of Consciousness: alert  Arousal Level: opens eyes spontaneously  Orientation: oriented x 4  Mood/Behavior: calm, cooperative  Best Language: 0 - No aphasia  Speech: clear    Living Environment:   People in home: spouse     Current living Arrangements:        Able to return to prior arrangements:         Family/Social Support:  Care provided by:    Provides care for:    Marital Status:   Support system:            Description of Support System:           Current Resources:   Patient receiving home care services: No        Community Resources: None  Equipment currently used at home: none  Supplies currently used at home:      Employment/Financial:  Employment Status:          Financial Concerns:             Does the patient's insurance plan have a 3 day qualifying hospital stay waiver?  No    Lifestyle & Psychosocial Needs:  Social Drivers of Health     Food Insecurity: Low Risk  (5/24/2025)    Food Insecurity     Within the past 12 months, did you worry that your food would run out before you got money to buy more?: No     Within the past 12 months, did the food you bought just not last and you didn t have money to get more?: No   Depression: Not at risk (3/20/2024)    Received from [facility]    PHQ-2     PHQ-2 Score: 0   Housing Stability:  Low Risk  (5/24/2025)    Housing Stability     Do you have housing? : Yes     Are you worried about losing your housing?: No   Tobacco Use: High Risk (3/20/2024)    Received from [facility]    Patient History     Smoking Tobacco Use: Every Day     Smokeless Tobacco Use: Never     Passive Exposure: Not on file   Financial Resource Strain: Low Risk  (5/24/2025)    Financial Resource Strain     Within the past 12 months, have you or your family members you live with been unable to get utilities (heat, electricity) when it was really needed?: No   Alcohol Use: Not on file   Transportation Needs: Low Risk  (5/24/2025)    Transportation Needs     Within the past 12 months, has lack of transportation kept you from medical appointments, getting your medicines, non-medical meetings or appointments, work, or from getting things that you need?: No   Physical Activity: Not on file   Interpersonal Safety: Low Risk  (5/24/2025)    Interpersonal Safety     Do you feel physically and emotionally safe where you currently live?: Yes     Within the past 12 months, have you been hit, slapped, kicked or otherwise physically hurt by someone?: No     Within the past 12 months, have you been humiliated or emotionally abused in other ways by your partner or ex-partner?: No   Stress: Not on file   Social Connections: Not on file   Health Literacy: Not on file       Functional Status:  Prior to admission patient needed assistance:   Dependent ADLs:: Independent  Dependent IADLs:: Independent       Mental Health Status:          Chemical Dependency Status:                Values/Beliefs:  Spiritual, Cultural Beliefs, Rastafarian Practices, Values that affect care:                 Discussed  Partnership in Safe Discharge Planning  document with patient/family: No    Additional Information:  Met with patient at bedside; explained role in discharge planning.    Consult notes needing assist in scheduling Hem/Onc appointment post  hospital.    Patient admitted with a PE/DVT.  He is discharging home today on a DOAC.  He needs to follow up with Hem/Onc as soon as possible to manage his DOAC and ongoing follow up.  He states he had gone to a Hem/Onc clinic near [facility] and did not want to go back there.  He would like to go to one near [facility].  There is a [facility] at [facility] with [facility].  The hospitalist added a  referral for them and they will call the patient to schedule.    [place] became SOB and sought care.  He is independent, lives with his spouse.  He denies any further needs but does appreciate assistance in scheduling with Hem/Onc post hospital.      Next Steps: No further care management intervention anticipated at this time.  Please re-consult if further needs arise.  Care management signing off.          Bhavna Jones RN  Inpatient Float Care Coordinator  [facility]  [email or web address]

## 2025-05-25 NOTE — PLAN OF CARE
Patient Name: Jose Ramirez  MRN: [id number]  Date of Admission: 5/24/2025  Reason for Admission: Bilateral PE  Level of Care: [facility]    Assessment    Resp: LS diminished, placed on 2L NC while sleeping d/t desatting to mid 80's while sleeping; chronic congested, non-productive cough  Telemetry: SR;SB toward end of shift  Neuro: A&O x4  Pain: Pt denied pain this shift and declined the need for any medications  GI/: Voiding adequately using urinal at beside typically; BS +, No BM  Skin/Wounds: Scattered patches of peeling/scabbed skin to BLE and buttocks, eczema per pt report  Lines/Drains: PIV infusing with heparin at 1550 units/hr, currently paused d/t supra-therapeutic level; next anti-xa lab at 1345  Activity: SBA in room, steady; able to turn self independently in bed  Sleep: Pt resting between cares  Abnormal Labs: anti-xa 0.95, heparin gtt paused for 60 minutes to resume at 0745 at 200 units less per hour    Aggression Stop Light: Green          Patient Care Plan: Pt likely to transfer to another unit later today per report

## 2025-05-25 NOTE — DISCHARGE SUMMARY
[facility]  Hospitalist Discharge Summary      Date of Admission:  5/24/2025  Date of Discharge:  5/25/2025 11:39 AM  Discharging Provider: Med Mayers MD  Discharge Service: Hospitalist Service    Discharge Diagnoses   Acute bilateral PE and bilateral DVT  Pulmonary nodules with hilar LAD  COPD  AAA  Thyroid nodules  HLD  GERD  ARACELY  BPH    Clinically Significant Risk Factors     # Overweight: Estimated body mass index is 27.46 kg/m  as calculated from the following:    Height as of this encounter: 1.829 m (6').    Weight as of this encounter: 91.9 kg (202 lb 8 oz).       Follow-ups Needed After Discharge   Follow-up Appointments       Follow Up      Follow up with outpatient hematologist/oncologist at next available visit to follow up recurrent pulmonary embolus (PE, or blood clot in the lung) as well as for ongoing monitoring of lung nodules noted on CT scan.        Hospital Follow-up with Existing Primary Care Provider (PCP)          Schedule Primary Care visit within: 30 Days   Recommended labs and Imaging (to be ordered by Primary Care Provider): thyroid US, CT chest in 6 months if not performed by Oncologist               Discharge Disposition   Discharged to home  Condition at discharge: Stable    Hospital Course   Jose Ramirez is a 75 year old male admitted on 5/24/2025.  Past medical history significant for PE no longer on anticoagulation, COPD, HLD, pre-DM, GERD, anxiety, coronary calcification per CT who is a direct admission to [facility] from [facility] on 5/24/2025 for further management of pulmonary emboli.         Acute bilateral pulmonary emboli, right heart strain ruled out  Bilateral popliteal DVT  *Patient presented to OSH with acute onset shortness of breath the day of admission   *elevated D-dimer with CT chest showing acute segmental and subsegmental PEs of bilateral upper lobes, right middle lobe, right lower lobe with associated moderate right heart strain, among other  findings below   *troponin at OSH within normal range x2, TTE 5/24 showing normal RV size and function (normal LVEF with grade 1 diastolic dysfunction)  *lower ext US showing central linear nonocclusive thrombus in the bilateral popliteal veins of unclear chronicity, favoring chronic/subacute   *case discussed with IR by admitting provider, no role for intervention given PE burden  *note previous history of PE 2016 though to be provoked after surgery and a DVT in 2022 thought to be provoked by immobility . He saw Hematology though [facility] at that time.   *he was treated with heparin gtt with improvement in symptoms and transitioned to Eliquis prior to discharge and educated on signs/symptoms of intracranial or GI bleeding that require prompt medical attention     --continue Eliquis, follow up with Heme/Onc     New and enlarging pulmonary nodules with mild right hilar lymphadenopathy  *CT chest in ED shows new 6mm nodule RUL and enlarging pulmonary nodule LLL measuring 7 mm as well as mild right hilar adenopathy. Prior CTs have shows sub 6mm nodules which are unchanged.   *He is a current smoker. Need for close follow up/surveillance was discussed on admission.   --follow up with Heme/Onc with repeat CT chest in 6 months     COPD  *CT shows mild to moderate emphysema. He reports chronic productive cough unchanged currently.   --continue PTA Advair     AAA 4.0cm  Seen on CT imaging  --follow up PCP for surveillance      Thyroid nodules  Seen incidentally on CT.   --follow up with PCP for outpatient thyroid US      Other chronic stable medical conditions:  HLD- Continue statin  GERD- Continue PPI   ARACELY- Continue PTA fluoxetine   BPH-  continue flomax        Consultations This Hospital Stay   PHARMACY IP CONSULT  PHARMACY LIAISON FOR MEDICATION COVERAGE CONSULT  CARE MANAGEMENT / SOCIAL WORK IP CONSULT  INTERVENTIONAL RADIOLOGY ADULT/PEDS IP CONSULT    Code Status   Full Code    Time Spent on this  Encounter   I, Med Mayers MD, personally saw the patient today and spent greater than 30 minutes discharging this patient.       Med Mayers MD  [facility]  [address] [place] [address]  Phone: [phone]  ______________________________________________________________________    Physical Exam   Vital Signs: Temp: 97.9  F (36.6  C) Temp src: Oral BP: 130/80 Pulse: 66   Resp: 16 SpO2: 92 % O2 Device: Nasal cannula Oxygen Delivery: 2 LPM  Weight: 202 lbs 8 oz  General Appearance: Well nourished male in NAD  Respiratory: lungs CTAB  Cardiovascular: RRR, normal s1/s2 without murmur  GI: normal bowel sounds  Other: Awake and alert, CN grossly intact         Primary Care Physician   Blair Clarke    Discharge Orders      Reason for your hospital stay    You were admitted for shortness of breath due to blood clots in the lungs, which likely came from blood clots found in both of your lower extremities.  You have been started on a blood thinning medication to prevent these clots from growing larger.  Your body will gradually break these clots down in time.     Activity    Your activity upon discharge: activity as tolerated, avoid activities that place you at risk for trauma, particularly head trauma (ie unsecured heights such as tall ladders, be careful walking on ice, etc.).  It may take a few weeks for your breathing to return fully to normal, but should gradually improve.     Follow Up    Follow up with outpatient hematologist/oncologist at next available visit to follow up recurrent pulmonary embolus (PE, or blood clot in the lung) as well as for ongoing monitoring of lung nodules noted on CT scan.     When to contact your care team    Call 911 if you develop sudden onset severe headache (a typical headache is not of concern) or stroke-like symptoms (confusion, slurred speech or trouble finding your words, weakness on one side of the body, etc.) as these may be signs  of bleeding around or in the brain.     Notify your primary care doctor if you experience bright red blood in the stool or black, sticky, tar-like stool (digested blood) if you are otherwise feeling well.  If these occur in a large amount and you are feeling weak, lightheaded, or otherwise unwell, present to the emergency room as this may indicate a brisk bleed in your intestinal tract.     Diet    Follow this diet upon discharge:       Regular Diet Adult     Hospital Follow-up with Existing Primary Care Provider (PCP)            Significant Results and Procedures   Most Recent 3 CBC's:  Recent Labs   Lab Test 05/25/25  0541 05/24/25  0938 11/24/21  1548   WBC 8.0 6.1 9.0   HGB 14.0 16.4 15.2   MCV  --   --  94    296 296     Most Recent 3 BMP's:  Recent Labs   Lab Test 05/25/25  0541 11/24/21  1548    140   POTASSIUM 4.0 5.0   CHLORIDE 107 106   CO2 24 23   BUN 22.6 15   CR 1.06 1.01   ANIONGAP 9 11   KATEY 8.8 9.5   * 91     Most Recent D-dimer:  Recent Labs   Lab Test 11/24/21  1548   DD 2.32*   ,   Results for orders placed or performed during the hospital encounter of 05/24/25   US Lower Extremity Venous Duplex Bilateral    Narrative    EXAM: US LOWER EXTREMITY VENOUS DUPLEX BILATERAL  LOCATION: [facility]  DATE: 5/24/2025    INDICATION: Shortness of breath. Assess for DVT.  COMPARISON: 07/05/2022; 11/24/2021  TECHNIQUE: Venous Duplex ultrasound of bilateral lower extremities with and without compression, augmentation and duplex. Color flow and spectral Doppler with waveform analysis performed.    FINDINGS: Exam includes the common femoral, femoral, popliteal veins as well as segmentally visualized deep calf veins and greater saphenous vein.     RIGHT: Central linear thrombus is seen within the popliteal vein, nonocclusive. Other interrogated deep veins are patent. No superficial thrombophlebitis. No popliteal cyst.    LEFT: Central linear thrombus is seen in the  popliteal vein, nonocclusive. Other interrogated deep veins are patent. No superficial thrombophlebitis. No popliteal cyst.        Impression    IMPRESSION:    1.  Central linear nonocclusive thrombus in the bilateral popliteal veins of unclear chronicity, favoring chronic/subacute.    This was reported to Ms. Paniagua PA at 4:35 PM on 2025.   Echocardiogram Complete     Value    LVEF  55-60%    [facility]    [id number]  [id number]  [id number]  [id number]^SIVA^TIGRE^DAYANNA     [facility]  Echocardiography Laboratory  [address] [place] [id number]     Name: NEGRITO MARY  MRN: [id number]  : 1950  Study Date: 2025 09:42 AM  Age: 75 yrs  Gender: Male  Patient Location: [facility]  Reason For Study: Pulmonary Emboli, Pulmonary Emboli  Ordering Physician: TIGRE PANIAGUA  Referring Physician: KAREN KWON  Performed By: SHANELL August     BSA: 2.2 m2  Height: 72 in  Weight: 220 lb  HR: 90  BP: 144/81 mmHg  ______________________________________________________________________________  Procedure  Echocardiogram with two-dimensional, color and spectral Doppler. Technically  adequate images limited to apical views.  ______________________________________________________________________________  Interpretation Summary     1. Normal LV size, wall thickness, and systolic function. LVEF is normal at  55-60%. Grade I (mild) diastolic dysfunction. No regional wall motion  abnormalities.  2. Normal RV size and systolic function. No evidence of RV strain.  3. Normal bi-atrial size.  4. No significant valvular stenosis or regurgitation.  5. Ascending aorta is borderline dilated (4.0 cm). Aortic root is normal size  at 3.8 cm.  6. No pericardial effusion.     No prior study available for comparison.  ______________________________________________________________________________  Left Ventricle  The left ventricle is normal in size. There is normal left ventricular wall  thickness. Left  ventricular systolic function is normal. The visual ejection  fraction is 55-60%. Grade I or early diastolic dysfunction. No regional wall  motion abnormalities noted.     Right Ventricle  The right ventricle is normal size. The right ventricular systolic function is  normal.     Atria  Normal left atrial size. Right atrial size is normal.     Mitral Valve  The mitral valve is normal in structure and function. There is trace mitral  regurgitation. There is no mitral valve stenosis.     Tricuspid Valve  The tricuspid valve is normal in structure and function. There is trace  tricuspid regurgitation. Right ventricular systolic pressure could not be  approximated due to inadequate tricuspid regurgitation. IVC diameter <2.1 cm  collapsing >50% with sniff suggests a normal RA pressure of 3 mmHg.     Aortic Valve  The aortic valve is not well visualized. No aortic regurgitation is present.  No aortic stenosis is present.     Pulmonic Valve  The pulmonic valve is not well visualized.     Vessels  The aortic root is normal size. Ascending aorta dilatation is present.     Pericardium  There is no pericardial effusion.     ______________________________________________________________________________  MMode/2D Measurements & Calculations  IVSd: 0.80 cm  LVIDd: 4.7 cm  LVIDs: 2.9 cm  LVPWd: 1.0 cm  FS: 38.3 %  LV mass(C)d: 142.7 grams  LV mass(C)dI: 64.3 grams/m2     Ao root diam: 3.8 cm  asc Aorta Diam: 4.0 cm  LVOT diam: 2.2 cm  LVOT area: 3.8 cm2  Ao root diam index Ht(cm/m): 2.1  Ao root diam index BSA (cm/m2): 1.7  Asc Ao diam index BSA (cm/m2): 1.8  Asc Ao diam index Ht(cm/m): 2.2  LA Volume (BP): 55.1 ml  LA Volume Index (BP): 24.8 ml/m2  RWT: 0.43     TAPSE: 2.6 cm     Doppler Measurements & Calculations  MV E max yoni: 61.3 cm/sec  MV A max yoni: 108.0 cm/sec  MV E/A: 0.57  MV dec slope: 402.0 cm/sec2  MV dec time: 0.15 sec  PA acc time: 0.07 sec  E/E' av.9  Lateral E/e': 5.9  Medial E/e': 9.9  RV S Ynoi: 11.7  cm/sec     ______________________________________________________________________________  Report approved by: MD Lex Perez on 05/24/2025 12:47 PM             Discharge Medications   Current Discharge Medication List        START taking these medications    Details   apixaban ANTICOAGULANT (ELIQUIS) 5 MG tablet Take 2 tablets (10 mg) by mouth 2 times daily for 7 days, THEN 1 tablet (5 mg) 2 times daily.  Qty: 88 tablet, Refills: 0    Associated Diagnoses: Acute pulmonary embolism without acute cor pulmonale, unspecified pulmonary embolism type (H)           CONTINUE these medications which have NOT CHANGED    Details   acetaminophen (TYLENOL) 500 MG tablet [ACETAMINOPHEN (TYLENOL) 500 MG TABLET] Take 1,000 mg by mouth every 6 (six) hours as needed for pain or fever.      albuterol (PROVENTIL HFA;VENTOLIN HFA) 90 mcg/actuation inhaler [ALBUTEROL (PROVENTIL HFA;VENTOLIN HFA) 90 MCG/ACTUATION INHALER] Inhale 2 puffs every 4 (four) hours as needed for wheezing.      ALPRAZolam (XANAX) 1 MG tablet [ALPRAZOLAM (XANAX) 1 MG TABLET] Take 1 mg by mouth daily as needed for anxiety.      atorvastatin (LIPITOR) 40 MG tablet Take 40 mg by mouth daily.      cetirizine (ZYRTEC) 10 MG tablet [CETIRIZINE (ZYRTEC) 10 MG TABLET] Take 10 mg by mouth daily.      EPINEPHrine (EPIPEN) 0.3 mg/0.3 mL injection [EPINEPHRINE (EPIPEN) 0.3 MG/0.3 ML INJECTION] Inject 0.3 mL (0.3 mg total) into the shoulder, thigh, or buttocks as needed for anaphylaxis. Inject into thigh.  Qty: 1, Refills: 3      FLUoxetine (PROZAC) 20 MG capsule [FLUOXETINE (PROZAC) 20 MG CAPSULE] Take 40 mg by mouth daily.      fluticasone-salmeterol (ADVAIR) 250-50 mcg/dose DISKUS [FLUTICASONE-SALMETEROL (ADVAIR) 250-50 MCG/DOSE DISKUS] Inhale 1 puff 2 (two) times a day.      multivitamin therapeutic (THERAGRAN) tablet [MULTIVITAMIN THERAPEUTIC (THERAGRAN) TABLET] Take 1 tablet by mouth daily.      omeprazole (PRILOSEC) 20 MG capsule Take 40 mg by mouth daily.       tamsulosin (FLOMAX) 0.4 MG capsule Take 0.4 mg by mouth daily.           Allergies   Allergies   Allergen Reactions    Polysorbate [Sorbitan] Swelling

## 2025-05-27 DIAGNOSIS — R91.8 PULMONARY NODULES: Primary | ICD-10-CM

## 2025-05-28 ENCOUNTER — PATIENT OUTREACH (OUTPATIENT)
Dept: ONCOLOGY | Facility: CLINIC | Age: 75
End: 2025-05-28
Payer: COMMERCIAL

## 2025-05-28 ENCOUNTER — PRE VISIT (OUTPATIENT)
Dept: ONCOLOGY | Facility: CLINIC | Age: 75
End: 2025-05-28
Payer: COMMERCIAL

## 2025-05-28 NOTE — PROGRESS NOTES
New IP (Interventional Pulmonology) referral rec'd.  Chart reviewed.        New Patient: Interventional Pulmonary (Lung nodule) Nurse Navigator Note    Referring provider: Med Mayers [facility]    Referred to (specialty): Interventional Pulmonary (Lung nodule)    Requested provider (if applicable): n/a    Date Referral Received: 5/28/2025    Evaluation for:  monitoring of pulmonary nodules    Clinical History (per Nurse review of records provided):    **[facility]**    [facility]   [facility]ATION[facility]   DATE: 5/24/2025     INDICATION: Elevated d-dimer.   COMPARISON: 1/16/2018.   TECHNIQUE: CT chest pulmonary angiogram during arterial phase injection of IV contrast. Multiplanar reformats and MIP reconstructions were performed. Dose reduction techniques were used.   CONTRAST: None.     FINDINGS:     ANGIOGRAM CHEST: There are segmental and subsegmental acute pulmonary emboli of the bilateral upper, right middle, and right lower lobe pulmonary arterial systems.     Moderate right heart strain (RV: LV ratio measures 1.2:1), with associated flattening of the interventricular septum.     Ascending thoracic aortic aneurysm, measuring 4.0 cm. Mild atherosclerosis.     LUNGS AND PLEURA: Mild tracheobronchial debris. Mild diffuse bronchial wall thickening. Mild-to-moderate centrilobular and paraseptal emphysema. No acute airspace consolidation. Mild dependent atelectatic change.     New 6 mm nodule of the right upper lobe, series 5 image 66. Enlarging 7 mm nodule of the left lower lobe, series 5 image 91, previously 4 mm. Other sub-6 mm pulmonary nodules are unchanged.     No pneumothorax.     No pleural effusion.     MEDIASTINUM/AXILLAE: Low-attenuation thyroid nodules, largest measuring up to 19 mm in the right, series 4 image 17. Nonemergent thyroid sonography follow-up is recommended. Mild right hilar lymphadenopathy. For  reference, an enlarged right hilar lymph node measures 12 mm in short axis, series 4 image 75.     Thoracic esophagus is unremarkable.      No axillary lymphadenopathy.     Mild bilateral gynecomastia.     Heart is normal in size. Trace pericardial fluid.     CORONARY ARTERY CALCIFICATION: Severe.     UPPER ABDOMEN: Cholecystectomy. Pancreatic body and tail calcifications, consistent with chronic pancreatitis. Associated mild dilatation and irregularity of the main pancreatic duct.     MUSCULOSKELETAL: No suspicious abnormality.     OTHER: No additionally suspicious abnormality.     IMPRESSION:   1. Acute segmental and subsegmental pulmonary emboli, with associated moderate right heart strain.   2.  New and enlarging pulmonary nodules, largest measuring 7 mm in the left lower lobe. A follow-up chest CT is recommended in 6 months.   3.  Mild right hilar lymphadenopathy. Attention on follow-up to CT.   4.  Ascending thoracic aortic aneurysm, measuring 4.0 cm.   5.  Mild-to-moderate pulmonary emphysema.   6.  Chronic pancreatitis.   7.  Low-attenuation thyroid nodules, largest measuring up to 19 mm in the right. Nonemergent thyroid sonography follow-up recommended.    Records Location: [facility] &  ([facility] and )    RECORDS NEEDED:  6/25/2015 to present--all  CHEST imaging pushed to PACS from  & [facility]--thank you!!    Additional testing needed prior to consult: none

## 2025-05-28 NOTE — TELEPHONE ENCOUNTER
RECORDS STATUS - ALL OTHER DIAGNOSIS      RECORDS RECEIVED FROM:    DIAGNOSIS: Pulmonary nodules [R91.8]     NOTES STATUS DETAILS   OFFICE NOTE from referring provider  Dr. Med Mayers   OFFICE NOTE from medical oncologist     OFFICE NOTE from other specialist     DISCHARGE SUMMARY from hospital     DISCHARGE REPORT from the ER     OPERATIVE REPORT     MEDICATION LIST     LABS     PATHOLOGY REPORTS     ANYTHING RELATED TO DIAGNOSIS     PATHOLOGY FEDEX TRACKING   Tracking #:   GENONOMIC TESTING     TYPE:     IMAGING (NEED IMAGES & REPORT)     CT SCANS PACS/Req 05/28 [facility]:  05/24/25: CTA Chest    PACS:  03/19/116: CT Chest    [facility]:  01/16/18: CT Chest   MRI     XRAYS PACS/Req 05/28 [facility]:  05/24/25: XR Chest    PACS:  03/17/16: XR Chest   ULTRASOUND     PET     IMAGE DISC FEDEX TRACKING   Tracking #:

## 2025-06-02 ENCOUNTER — TRANSFERRED RECORDS (OUTPATIENT)
Dept: HEALTH INFORMATION MANAGEMENT | Facility: CLINIC | Age: 75
End: 2025-06-02
Payer: COMMERCIAL

## 2025-06-24 NOTE — PROGRESS NOTES
[facility]  [address] [place] [id number]  Main: [phone], Fax: [phone]    [facility] at: [facility] at [address] [place] [address]    Outpatient Visit Note:    Patient: Jose Ramirez  MRN: [id number]  : 1950  IGGY: July 15, 2025  Location of this writer at the time of this clinic visit was conducted: [facility].  Location of the patient at the time of this clinic visit was conducted: [facility].     Reason for visit:  Bilateral pulmonary embolism and bilateral lower extremity DVT on 2025.     HPI:  Billy is a 75 year old male with a history of COPD, hyperlipidemia, pre-diabetes, GERD, anxiety, coronary calcification per CT, who is found to have bilateral pulmonary embolism and bilateral lower extremity DVT back on 2025, referred by Dr. Med Mayers of [facility] for consultation. Currently, Billy is on apixaban at 5 mg PO BID dosing.     In review of his chart, dated back ton 3/17/2016, he presented to abdominal pain and was found to have acute appendicitis on CT and also incidentally found to have segmental pulmonary arteries thrombosis in both lower lobes. He underwent urgent appendectomy for ruptured appendix on 3/17/2016. During this hospitalization, he did have free protein S antigen, chromogenic protein C, factor V Leiden, and prothrombin gene mutations testing done and all were within normal range or negative. From what I can tell, he was on warfarin therapy until 2016 and then discontinued.     2017 and 2018, he had a left leg venous ultrasound done for swelling and pain and was negative for DVT.     2021, he recalls that shortly after he received a [employer] COVID-19 vaccination, he developed left leg swelling and an ultrasound at the time showed acute  "DVT within the tibioperoneal trunk that was nonocclusive. He also was found to have occlusive superficial thrombophlebitis extending from the top of the left foot to the proximal calf within the greater saphenous vein. He recalls that he was placed on Xarelto for about 4 months at the time.     7/5/2022, he had a right leg venous ultrasound done for pain and swelling and was negative for DVT.     Repeat CT on 3/19/2016 showed and confirmed bilateral upper and lower lobe segmental and subsegmental pulmonary emboli.     Over Memorial Day weekend, he drove up to [place] to his cabin, which is a trip that he has done for many years. This drive is a little over an hour drive from his house here in the [facility].     A few weeks prior to his presentation to the emergency department in [facility] on 5/24/2025, he started to experience some mild increase in his baseline shortness of breath. On the morning of 5/24/2025, he ambulated to the bathroom when he became significantly short of breath and because of this, he presented to the local emergency department for evaluation. There he was found to have an elected D-Dimer and a CTA chest showed: \"1.  Acute segmental and subsegmental pulmonary emboli, with associated moderate right heart strain. 2.  New and enlarging pulmonary nodules, largest measuring 7 mm in the left lower lobe. A follow-up chest CT is recommended in 6 months. 3.  Mild right hilar lymphadenopathy. Attention on follow-up to CT. 4. Ascending thoracic aortic aneurysm, measuring 4.0 cm. 5.  Mild-to-moderate pulmonary emphysema. 6.  Chronic pancreatitis.  7.  Low-attenuation thyroid nodules, largest measuring up to 19 mm in the right. Nonemergent thyroid sonography follow-up recommended.\" With this report, he was started on unfractionated heparin and he subsequently was transferred to [facility] for further management. At presentation to [facility], he reported that he also has been " "having some mild left leg pain for about 2 weeks. Bilateral lower extremity venous ultrasound was done showing \"Central linear nonocclusive thrombus in the bilateral popliteal veins of unclear chronicity, favoring chronic/subacute.\" He was admitted overnight for management. Was discharged on 5/25/2025 with apixaban and was referred to our clinic for consultation. He was also told to see his primary care provider for follow up.     6/16/2025, saw his primary care provider at [facility] and according to the patient, he was told to get a thyroid ultrasound soon and has scheduled for a repeat chest CT at the end of Nov 2025.     Currently, he is on apixaban at 5 mg PO BID dosing. He denies any bleeding complications. He reports that his breathing has return to his baseline. Denies any chest pain.     ROS:  Denies any bleeding complications. Specifically, no frequent epistaxis. No issues with oral mucosal bleeding. Denies any hematuria or blood in stools. Denies any shortness of breath. No chest pain. No cough. No fever.      Medications:  Current Outpatient Medications   Medication Sig Dispense Refill    acetaminophen (TYLENOL) 500 MG tablet [ACETAMINOPHEN (TYLENOL) 500 MG TABLET] Take 1,000 mg by mouth every 6 (six) hours as needed for pain or fever.      albuterol (PROVENTIL HFA;VENTOLIN HFA) 90 mcg/actuation inhaler [ALBUTEROL (PROVENTIL HFA;VENTOLIN HFA) 90 MCG/ACTUATION INHALER] Inhale 2 puffs every 4 (four) hours as needed for wheezing.      ALPRAZolam (XANAX) 1 MG tablet [ALPRAZOLAM (XANAX) 1 MG TABLET] Take 1 mg by mouth daily as needed for anxiety.      apixaban ANTICOAGULANT (ELIQUIS) 5 MG tablet Take 2 tablets (10 mg) by mouth 2 times daily for 7 days, THEN 1 tablet (5 mg) 2 times daily. 88 tablet 0    atorvastatin (LIPITOR) 40 MG tablet Take 40 mg by mouth daily.      cetirizine (ZYRTEC) 10 MG tablet [CETIRIZINE (ZYRTEC) 10 MG TABLET] Take 10 mg by mouth daily.      EPINEPHrine (EPIPEN) 0.3 mg/0.3 mL " injection [EPINEPHRINE (EPIPEN) 0.3 MG/0.3 ML INJECTION] Inject 0.3 mL (0.3 mg total) into the shoulder, thigh, or buttocks as needed for anaphylaxis. Inject into thigh. 1 3    FLUoxetine (PROZAC) 20 MG capsule [FLUOXETINE (PROZAC) 20 MG CAPSULE] Take 40 mg by mouth daily.      fluticasone-salmeterol (ADVAIR) 250-50 mcg/dose DISKUS [FLUTICASONE-SALMETEROL (ADVAIR) 250-50 MCG/DOSE DISKUS] Inhale 1 puff 2 (two) times a day.      multivitamin therapeutic (THERAGRAN) tablet [MULTIVITAMIN THERAPEUTIC (THERAGRAN) TABLET] Take 1 tablet by mouth daily.      omeprazole (PRILOSEC) 20 MG capsule Take 40 mg by mouth daily.      tamsulosin (FLOMAX) 0.4 MG capsule Take 0.4 mg by mouth daily.       No current facility-administered medications for this visit.     Allergies:  Allergies   Allergen Reactions    Polysorbate [Sorbitan] Swelling     PmHx:  As described above.     Social History:   He lives with his wife in [facility].   He reports that he has been smoking cigarettes since he was a teenager and currently continues to smoke about 1/2 a pack of cigarettes a day.     Family History:  Mother is 100 years of age and is living. She has no history of DVT/PE.   Father  of complications of diabetes many years ago. No history of venous thrombosis.   He has one sister who is 73 years of age and she does have a history of DVT. Billy brings in some lab testing results on his sister showing that she has a very mildly elevated beta 2 glycoprotein IgA level with IgG and IgM levels being negative. Her factor V Leiden mutation and prothrombin gene mutation were negative.   Billy had 2 children. His son passed away from a brain tumor many years ago, he had no history of venous thrombosis. His 44 year old living daughter has no history of venous thromboembolism.     Objective:  Vitals: BP (!) 169/85 (BP Location: Right arm)   Pulse 101   Temp 97.6  F (36.4  C) (Tympanic)   Wt 93.2 kg (205 lb 6.4 oz)   SpO2 98%   BMI 27.86 kg/m     Exam:   He has some eczema of his pre-tibial area of both lower extremities but no open ulcerations or wound.     Labs:  Component      Latest Ref Rng 5/25/2025  5:41 AM   Sodium      135 - 145 mmol/L 140    Potassium      3.4 - 5.3 mmol/L 4.0    Chloride      98 - 107 mmol/L 107    Carbon Dioxide (CO2)      22 - 29 mmol/L 24    Anion Gap      7 - 15 mmol/L 9    Urea Nitrogen      8.0 - 23.0 mg/dL 22.6    Creatinine      0.67 - 1.17 mg/dL 1.06    GFR Estimate      >60 mL/min/1.73m2 72    Calcium      8.8 - 10.4 mg/dL 8.8    Glucose      70 - 99 mg/dL 119 (H)    WBC      4.0 - 11.0 10e3/uL 8.0    RBC Count --    Hemoglobin      13.3 - 17.7 g/dL 14.0    Hematocrit --    MCV --    MCH --    MCHC --    RDW --    Platelet Count      150 - 450 10e3/uL 277       5/24/2025, labs done at [facility]:  Creatinine 1.2; BUN 20; Alk phos 66; AST 21; ALT 12; Total Bilirubin 0.3;     Imaging:  Reviewed and are as described above.     Assessment:  In summary, Billy is a 75 year old male with a history of COPD, hyperlipidemia, pre-diabetes, GERD, anxiety, coronary calcification per CT, history of provoked pulmonary embolism back in 2016 and a minimally provoked left distal leg DVT in 2021, who is found to have bilateral pulmonary embolism and bilateral lower extremity DVT back on 5/24/2025, referred by Dr. Med Mayers of [facility] for consultation.    From what I can tell, Billy's first ever venous thromboembolic event was dated back to 2016 when he was found to have ruptured appendicitis. The pulmonary embolism was discovered incidentally on CT abd/pelvis for evaluation of his abdominal pain at the time. This was likely a provoked venous thromboembolic event due to inflammation of his appendicitis. He was appropriately treated with about 5 months of anticoagulation therapy with warfarin at the time.     Then he had a left distal leg DVT found on 11/24/2021 for which at the time it was contributed to his  COVID-19 vaccination. He was treated with about 4 months of rivaroxaban.     His most recent bilateral pulmonary embolism and bilateral lower extremity DVT on 5/24/2025 was an entirely unprovoked venous thromboembolic event.     As noted above, Billy has been a long term cigarette smoker since he was a teenager and he continues to smoke to this day and is currently smokes about 1/2 a pack of cigarettes a day. CTA Chest on 5/24/2025 did show pulmonary nodules with one that is reportedly new and the other has increased in size. This requires follow up given his cigarettes smoking history. Additionally, he was also found to have a thyroid nodule at the time of his CTA done on 5/24/2025 and he has not had a radiologist recommended thyroid ultrasound done as of today. However, the patient informs me that his primary care provider is aware of these findings and has ordered the thyroid ultrasound as well as a repeat chest CT to be done in Nov 2025.     Diagnosis:  History of provoked pulmonary embolism in 2016 S/P about 5 months of anticoagulation therapy with warfarin.   History of minimally provoked left distal leg DVT in 2021, S/P about 4 months of anticoagulation therapy with rivaroxaban.   Unprovoked bilateral pulmonary embolism and bilateral lower extremity DVT found on 5/24/2025.   Tobacco dependency and is currently smoking about 1/2 a pack of cigarettes a day.   Pulmonary nodules and per recent CTA chest, at least one of these nodules is new and the other has increased in size. Concern for possible malignancy in this long term cigarette smoker.   Incidentally found thyroid nodule on CT chest on 5/24/2025.     Plan:  Today, I spent quite a bit of time to educate him on DVT/PE, provoked vs unprovoked venous thromboembolic events, discuss the differences between venous and arterial thrombosis, and the general approach in regard to anticoagulation therapy and management. I have also answered all his questions to his  satisfaction.     I explain to Billy that his recent bilateral pulmonary embolism and bilateral lower extremity DVT on 5/24/2025 was an unprovoked venous thromboembolic event and thus in accordance to current American Society of Hematology (CHANDA) guidelines, he should remain on indefinite anticoagulation therapy. The patient is already prepared for me to give this recommendation given his previous history of venous thrombosis. Thus he is in agreement with staying on indefinite anticoagulation therapy. He has done well for the past month or so on apixaban and thus I will keep him on apixaban at 5 mg PO BID dosing. I have renewed his apixaban prescription today with a one year refill.     I will plan to repeat his bilateral lower extremity venous ultrasound at the end of Nov 2025 and have him come back to see me for follow up 1-2 weeks after that repeat lower extremity venous ultrasound for follow up.    He already has partial inherit thrombophilia workup done back in 2016 as mentioned above, which were negative. I do not feel that further workup is necessary as the result of such workup will not change my recommendation that this patient should remain on indefinite anticoagulation therapy.     As mentioned, he is found to have new pulmonary nodule and an increased in size pulmonary nodule as compared to previous imaging studies. Given his smoking history, these are concerning for malignancy and requires follow up. I will defer the follow up for these pulmonary nodules to his primary care provider.     Additionally, he was also incidentally found to have a thyroid nodule on CT back on 5/24/2025. Radiology at the time recommended nonemergent thyroid sonography, which the patient belief that his primary care provider has ordered. I will defer follow up of his thyroid nodule to his primary care provider.     We do recommend age appropriate cancer screening for all patients with a history of venous thromboembolism. I will  defer this to his primary care provider.     Today, I have encouraged the patient to consider smoking cessation.     The patient is provided with our clinic's contact information and is instructed to call if he should have any further questions or concerns or if he should need any invasive or surgical procedures in the future.         Augustus Recio PA-C, MPAS  Physician Assistant  [facility].     70 minutes spent by me on the date of the encounter doing chart review, history and exam, documentation and further activities per the note.    Time IN: 10:53  Time OUT: 11:45    The longitudinal plan of care for these conditions below were addressed during this visit. Due to the added complexity in care, I will continue to support Jose Ramirez  in the subsequent management of these conditions and with the ongoing continuity of care of these conditions.    Problem List Items Addressed This Visit as of 2/19/2024   History of provoked pulmonary embolism in 2016 S/P about 5 months of anticoagulation therapy with warfarin.   History of minimally provoked left distal leg DVT in 2021, S/P about 4 months of anticoagulation therapy with rivaroxaban.   Unprovoked bilateral pulmonary embolism and bilateral lower extremity DVT found on 5/24/2025.   Tobacco dependency and is currently smoking about 1/2 a pack of cigarettes a day.   Pulmonary nodules and per recent CTA chest, at least one of these nodules is new and the other has increased in size. Concern for possible malignancy in this long term cigarette smoker.   Incidentally found thyroid nodule on CT chest on 5/24/2025.

## 2025-07-15 ENCOUNTER — OFFICE VISIT (OUTPATIENT)
Dept: HEMATOLOGY | Facility: CLINIC | Age: 75
End: 2025-07-15
Attending: HOSPITALIST
Payer: COMMERCIAL

## 2025-07-15 VITALS
OXYGEN SATURATION: 98 % | DIASTOLIC BLOOD PRESSURE: 85 MMHG | HEART RATE: 101 BPM | WEIGHT: 205.4 LBS | BODY MASS INDEX: 27.86 KG/M2 | TEMPERATURE: 97.6 F | SYSTOLIC BLOOD PRESSURE: 169 MMHG

## 2025-07-15 DIAGNOSIS — I26.99 ACUTE PULMONARY EMBOLISM WITHOUT ACUTE COR PULMONALE, UNSPECIFIED PULMONARY EMBOLISM TYPE (H): ICD-10-CM

## 2025-07-15 DIAGNOSIS — F17.210 CIGARETTE NICOTINE DEPENDENCE WITHOUT COMPLICATION: ICD-10-CM

## 2025-07-15 DIAGNOSIS — Z86.711 HISTORY OF PULMONARY EMBOLISM: Primary | ICD-10-CM

## 2025-07-15 DIAGNOSIS — R91.8 PULMONARY NODULES: ICD-10-CM

## 2025-07-15 DIAGNOSIS — Z82.49 FAMILY HISTORY OF DVT: ICD-10-CM

## 2025-07-15 DIAGNOSIS — Z86.718 PERSONAL HISTORY OF DVT (DEEP VEIN THROMBOSIS): ICD-10-CM

## 2025-07-15 DIAGNOSIS — J44.9 CHRONIC OBSTRUCTIVE PULMONARY DISEASE, UNSPECIFIED COPD TYPE (H): ICD-10-CM

## 2025-07-15 DIAGNOSIS — N17.9 AKI (ACUTE KIDNEY INJURY): ICD-10-CM

## 2025-07-15 PROCEDURE — 99205 OFFICE O/P NEW HI 60 MIN: CPT | Performed by: PHYSICIAN ASSISTANT

## 2025-07-15 PROCEDURE — 3077F SYST BP >= 140 MM HG: CPT | Performed by: PHYSICIAN ASSISTANT

## 2025-07-15 PROCEDURE — G0463 HOSPITAL OUTPT CLINIC VISIT: HCPCS | Performed by: PHYSICIAN ASSISTANT

## 2025-07-15 PROCEDURE — G2211 COMPLEX E/M VISIT ADD ON: HCPCS | Performed by: PHYSICIAN ASSISTANT

## 2025-07-15 PROCEDURE — 3079F DIAST BP 80-89 MM HG: CPT | Performed by: PHYSICIAN ASSISTANT

## 2025-07-15 NOTE — LETTER
[facility]  [address] [place] [id number]  Main: [phone], Fax: [phone]    Patient seen at: [facility] at [address] [place] [address]    Outpatient Visit Note:    Patient: Jose Ramirez  MRN: [id number]  : 1950  IGGY: July 15, 2025  Location of this writer at the time of this clinic visit was conducted: [facility].  Location of the patient at the time of this clinic visit was conducted: [facility].     Reason for visit:  Bilateral pulmonary embolism and bilateral lower extremity DVT on 2025.     HPI:  Billy is a 75 year old male with a history of COPD, hyperlipidemia, pre-diabetes, GERD, anxiety, coronary calcification per CT, who is found to have bilateral pulmonary embolism and bilateral lower extremity DVT back on 2025, referred by Dr. Med Mayers of [facility] for consultation. Currently, Billy is on apixaban at 5 mg PO BID dosing.     In review of his chart, dated back ton 3/17/2016, he presented to abdominal pain and was found to have acute appendicitis on CT and also incidentally found to have segmental pulmonary arteries thrombosis in both lower lobes. He underwent urgent appendectomy for ruptured appendix on 3/17/2016. During this hospitalization, he did have free protein S antigen, chromogenic protein C, factor V Leiden, and prothrombin gene mutations testing done and all were within normal range or negative. From what I can tell, he was on warfarin therapy until 2016 and then discontinued.     2017 and 2018, he had a left leg venous ultrasound done for swelling and pain and was negative for DVT.     2021, he recalls that shortly after he received a [employer] COVID-19 vaccination, he developed left leg swelling and an ultrasound at the time showed  "acute DVT within the tibioperoneal trunk that was nonocclusive. He also was found to have occlusive superficial thrombophlebitis extending from the top of the left foot to the proximal calf within the greater saphenous vein. He recalls that he was placed on Xarelto for about 4 months at the time.     7/5/2022, he had a right leg venous ultrasound done for pain and swelling and was negative for DVT.     Repeat CT on 3/19/2016 showed and confirmed bilateral upper and lower lobe segmental and subsegmental pulmonary emboli.     Over Memorial Day weekend, he drove up to [place] to his cabin, which is a trip that he has done for many years. This drive is a little over an hour drive from his house here in the [facility].     A few weeks prior to his presentation to the emergency department in [facility] on 5/24/2025, he started to experience some mild increase in his baseline shortness of breath. On the morning of 5/24/2025, he ambulated to the bathroom when he became significantly short of breath and because of this, he presented to the local emergency department for evaluation. There he was found to have an elected D-Dimer and a CTA chest showed: \"1.  Acute segmental and subsegmental pulmonary emboli, with associated moderate right heart strain. 2.  New and enlarging pulmonary nodules, largest measuring 7 mm in the left lower lobe. A follow-up chest CT is recommended in 6 months. 3.  Mild right hilar lymphadenopathy. Attention on follow-up to CT. 4. Ascending thoracic aortic aneurysm, measuring 4.0 cm. 5.  Mild-to-moderate pulmonary emphysema. 6.  Chronic pancreatitis.  7.  Low-attenuation thyroid nodules, largest measuring up to 19 mm in the right. Nonemergent thyroid sonography follow-up recommended.\" With this report, he was started on unfractionated heparin and he subsequently was transferred to [facility] for further management. At presentation to [facility], he reported that he also has " "been having some mild left leg pain for about 2 weeks. Bilateral lower extremity venous ultrasound was done showing \"Central linear nonocclusive thrombus in the bilateral popliteal veins of unclear chronicity, favoring chronic/subacute.\" He was admitted overnight for management. Was discharged on 5/25/2025 with apixaban and was referred to our clinic for consultation. He was also told to see his primary care provider for follow up.     6/16/2025, saw his primary care provider at [facility] and according to the patient, he was told to get a thyroid ultrasound soon and has scheduled for a repeat chest CT at the end of Nov 2025.     Currently, he is on apixaban at 5 mg PO BID dosing. He denies any bleeding complications. He reports that his breathing has return to his baseline. Denies any chest pain.     ROS:  Denies any bleeding complications. Specifically, no frequent epistaxis. No issues with oral mucosal bleeding. Denies any hematuria or blood in stools. Denies any shortness of breath. No chest pain. No cough. No fever.      Medications:  Current Outpatient Medications   Medication Sig Dispense Refill     acetaminophen (TYLENOL) 500 MG tablet [ACETAMINOPHEN (TYLENOL) 500 MG TABLET] Take 1,000 mg by mouth every 6 (six) hours as needed for pain or fever.       albuterol (PROVENTIL HFA;VENTOLIN HFA) 90 mcg/actuation inhaler [ALBUTEROL (PROVENTIL HFA;VENTOLIN HFA) 90 MCG/ACTUATION INHALER] Inhale 2 puffs every 4 (four) hours as needed for wheezing.       ALPRAZolam (XANAX) 1 MG tablet [ALPRAZOLAM (XANAX) 1 MG TABLET] Take 1 mg by mouth daily as needed for anxiety.       apixaban ANTICOAGULANT (ELIQUIS) 5 MG tablet Take 2 tablets (10 mg) by mouth 2 times daily for 7 days, THEN 1 tablet (5 mg) 2 times daily. 88 tablet 0     atorvastatin (LIPITOR) 40 MG tablet Take 40 mg by mouth daily.       cetirizine (ZYRTEC) 10 MG tablet [CETIRIZINE (ZYRTEC) 10 MG TABLET] Take 10 mg by mouth daily.       EPINEPHrine (EPIPEN) 0.3 " mg/0.3 mL injection [EPINEPHRINE (EPIPEN) 0.3 MG/0.3 ML INJECTION] Inject 0.3 mL (0.3 mg total) into the shoulder, thigh, or buttocks as needed for anaphylaxis. Inject into thigh. 1 3     FLUoxetine (PROZAC) 20 MG capsule [FLUOXETINE (PROZAC) 20 MG CAPSULE] Take 40 mg by mouth daily.       fluticasone-salmeterol (ADVAIR) 250-50 mcg/dose DISKUS [FLUTICASONE-SALMETEROL (ADVAIR) 250-50 MCG/DOSE DISKUS] Inhale 1 puff 2 (two) times a day.       multivitamin therapeutic (THERAGRAN) tablet [MULTIVITAMIN THERAPEUTIC (THERAGRAN) TABLET] Take 1 tablet by mouth daily.       omeprazole (PRILOSEC) 20 MG capsule Take 40 mg by mouth daily.       tamsulosin (FLOMAX) 0.4 MG capsule Take 0.4 mg by mouth daily.       No current facility-administered medications for this visit.     Allergies:  Allergies   Allergen Reactions     Polysorbate [Sorbitan] Swelling     PmHx:  As described above.     Social History:   He lives with his wife in [facility].   He reports that he has been smoking cigarettes since he was a teenager and currently continues to smoke about 1/2 a pack of cigarettes a day.     Family History:  Mother is 100 years of age and is living. She has no history of DVT/PE.   Father  of complications of diabetes many years ago. No history of venous thrombosis.   He has one sister who is 73 years of age and she does have a history of DVT. Billy brings in some lab testing results on his sister showing that she has a very mildly elevated beta 2 glycoprotein IgA level with IgG and IgM levels being negative. Her factor V Leiden mutation and prothrombin gene mutation were negative.   Billy had 2 children. His son passed away from a brain tumor many years ago, he had no history of venous thrombosis. His 44 year old living daughter has no history of venous thromboembolism.     Objective:  Vitals: BP (!) 169/85 (BP Location: Right arm)   Pulse 101   Temp 97.6  F (36.4  C) (Tympanic)   Wt 93.2 kg (205 lb 6.4 oz)   SpO2 98%   BMI  27.86 kg/m    Exam:   He has some eczema of his pre-tibial area of both lower extremities but no open ulcerations or wound.     Labs:  Component      Latest Ref Rng 5/25/2025  5:41 AM   Sodium      135 - 145 mmol/L 140    Potassium      3.4 - 5.3 mmol/L 4.0    Chloride      98 - 107 mmol/L 107    Carbon Dioxide (CO2)      22 - 29 mmol/L 24    Anion Gap      7 - 15 mmol/L 9    Urea Nitrogen      8.0 - 23.0 mg/dL 22.6    Creatinine      0.67 - 1.17 mg/dL 1.06    GFR Estimate      >60 mL/min/1.73m2 72    Calcium      8.8 - 10.4 mg/dL 8.8    Glucose      70 - 99 mg/dL 119 (H)    WBC      4.0 - 11.0 10e3/uL 8.0    RBC Count --    Hemoglobin      13.3 - 17.7 g/dL 14.0    Hematocrit --    MCV --    MCH --    MCHC --    RDW --    Platelet Count      150 - 450 10e3/uL 277       5/24/2025, labs done at [facility]:  Creatinine 1.2; BUN 20; Alk phos 66; AST 21; ALT 12; Total Bilirubin 0.3;     Imaging:  Reviewed and are as described above.     Assessment:  In summary, Billy is a 75 year old male with a history of COPD, hyperlipidemia, pre-diabetes, GERD, anxiety, coronary calcification per CT, history of provoked pulmonary embolism back in 2016 and a minimally provoked left distal leg DVT in 2021, who is found to have bilateral pulmonary embolism and bilateral lower extremity DVT back on 5/24/2025, referred by Dr. Med Mayers of [facility] for consultation.    From what I can tell, Billy's first ever venous thromboembolic event was dated back to 2016 when he was found to have ruptured appendicitis. The pulmonary embolism was discovered incidentally on CT abd/pelvis for evaluation of his abdominal pain at the time. This was likely a provoked venous thromboembolic event due to inflammation of his appendicitis. He was appropriately treated with about 5 months of anticoagulation therapy with warfarin at the time.     Then he had a left distal leg DVT found on 11/24/2021 for which at the time it was  contributed to his COVID-19 vaccination. He was treated with about 4 months of rivaroxaban.     His most recent bilateral pulmonary embolism and bilateral lower extremity DVT on 5/24/2025 was an entirely unprovoked venous thromboembolic event.     As noted above, Billy has been a long term cigarette smoker since he was a teenager and he continues to smoke to this day and is currently smokes about 1/2 a pack of cigarettes a day. CTA Chest on 5/24/2025 did show pulmonary nodules with one that is reportedly new and the other has increased in size. This requires follow up given his cigarettes smoking history. Additionally, he was also found to have a thyroid nodule at the time of his CTA done on 5/24/2025 and he has not had a radiologist recommended thyroid ultrasound done as of today. However, the patient informs me that his primary care provider is aware of these findings and has ordered the thyroid ultrasound as well as a repeat chest CT to be done in Nov 2025.     Diagnosis:  History of provoked pulmonary embolism in 2016 S/P about 5 months of anticoagulation therapy with warfarin.   History of minimally provoked left distal leg DVT in 2021, S/P about 4 months of anticoagulation therapy with rivaroxaban.   Unprovoked bilateral pulmonary embolism and bilateral lower extremity DVT found on 5/24/2025.   Tobacco dependency and is currently smoking about 1/2 a pack of cigarettes a day.   Pulmonary nodules and per recent CTA chest, at least one of these nodules is new and the other has increased in size. Concern for possible malignancy in this long term cigarette smoker.   Incidentally found thyroid nodule on CT chest on 5/24/2025.     Plan:  Today, I spent quite a bit of time to educate him on DVT/PE, provoked vs unprovoked venous thromboembolic events, discuss the differences between venous and arterial thrombosis, and the general approach in regard to anticoagulation therapy and management. I have also answered all his  questions to his satisfaction.     I explain to Billy that his recent bilateral pulmonary embolism and bilateral lower extremity DVT on 5/24/2025 was an unprovoked venous thromboembolic event and thus in accordance to current American Society of Hematology (CHANDA) guidelines, he should remain on indefinite anticoagulation therapy. The patient is already prepared for me to give this recommendation given his previous history of venous thrombosis. Thus he is in agreement with staying on indefinite anticoagulation therapy. He has done well for the past month or so on apixaban and thus I will keep him on apixaban at 5 mg PO BID dosing. I have renewed his apixaban prescription today with a one year refill.     I will plan to repeat his bilateral lower extremity venous ultrasound at the end of Nov 2025 and have him come back to see me for follow up 1-2 weeks after that repeat lower extremity venous ultrasound for follow up.    He already has partial inherit thrombophilia workup done back in 2016 as mentioned above, which were negative. I do not feel that further workup is necessary as the result of such workup will not change my recommendation that this patient should remain on indefinite anticoagulation therapy.     As mentioned, he is found to have new pulmonary nodule and an increased in size pulmonary nodule as compared to previous imaging studies. Given his smoking history, these are concerning for malignancy and requires follow up. I will defer the follow up for these pulmonary nodules to his primary care provider.     Additionally, he was also incidentally found to have a thyroid nodule on CT back on 5/24/2025. Radiology at the time recommended nonemergent thyroid sonography, which the patient belief that his primary care provider has ordered. I will defer follow up of his thyroid nodule to his primary care provider.     We do recommend age appropriate cancer screening for all patients with a history of venous  thromboembolism. I will defer this to his primary care provider.     Today, I have encouraged the patient to consider smoking cessation.     The patient is provided with our clinic's contact information and is instructed to call if he should have any further questions or concerns or if he should need any invasive or surgical procedures in the future.         Augustus Recio PA-C, MPAS  Physician Assistant  [facility].     70 minutes spent by me on the date of the encounter doing chart review, history and exam, documentation and further activities per the note.    Time IN: 10:53  Time OUT: 11:45    The longitudinal plan of care for these conditions below were addressed during this visit. Due to the added complexity in care, I will continue to support Jose Ramirez  in the subsequent management of these conditions and with the ongoing continuity of care of these conditions.    Problem List Items Addressed This Visit as of 2/19/2024   History of provoked pulmonary embolism in 2016 S/P about 5 months of anticoagulation therapy with warfarin.   History of minimally provoked left distal leg DVT in 2021, S/P about 4 months of anticoagulation therapy with rivaroxaban.   Unprovoked bilateral pulmonary embolism and bilateral lower extremity DVT found on 5/24/2025.   Tobacco dependency and is currently smoking about 1/2 a pack of cigarettes a day.   Pulmonary nodules and per recent CTA chest, at least one of these nodules is new and the other has increased in size. Concern for possible malignancy in this long term cigarette smoker.   Incidentally found thyroid nodule on CT chest on 5/24/2025.

## 2025-07-15 NOTE — PATIENT INSTRUCTIONS
[facility]  [facility]  [facility] [address], [place] [address]  Main: [phone], Fax: [phone]    It was a pleasure seeing you today.  Thank you for allowing us to be involved in your care. Please let us know if there is anything else we can do for you, so that we can be sure you are leaving completely satisfied with your care experience.    Please stay on your blood thinner:  Eliquis 5 mg every 12 hours (twice a day).  Please call us with any bleeding issues that you may have.    We would like you to repeat your imaging at the end of November. We then would like you to follow-up with Augustus either for an in-person or virtual appointment one week after the imaging to review results and ongoing plan.    Wear compression stockings if you have swelling in your leg. Take them off while you are sleeping. You may need to get new stockings every 3-6 months with regular wear.    Patient Resources:   For additional information, please see the following web link:  [email or web address]    Call the [facility]  at [phone].     -If surgeries or procedures are planned (for holding instructions).     -Any new symptoms of DVT (deep vein thrombosis) or PE (pulmonary embolism)    -pain     -swelling     -redness    -warmth    -shortness of breath    -chest pain    -coughing up blood    Return to clinic in  early December.  Please schedule return appointment with Augustus Recio PA-C .    Your nurse clinician is Talita Rose RN, BSN, PCCN [phone].   If she is unavailable and you have immediate concerns, please call [phone] and ask for a nurse.